# Patient Record
Sex: FEMALE | Race: WHITE | NOT HISPANIC OR LATINO | Employment: OTHER | URBAN - METROPOLITAN AREA
[De-identification: names, ages, dates, MRNs, and addresses within clinical notes are randomized per-mention and may not be internally consistent; named-entity substitution may affect disease eponyms.]

---

## 2019-12-25 ENCOUNTER — HOSPITAL ENCOUNTER (EMERGENCY)
Facility: HOSPITAL | Age: 79
Discharge: HOME | End: 2019-12-25
Attending: FAMILY MEDICINE
Payer: MEDICARE

## 2019-12-25 VITALS
HEART RATE: 55 BPM | SYSTOLIC BLOOD PRESSURE: 113 MMHG | RESPIRATION RATE: 9 BRPM | OXYGEN SATURATION: 99 % | DIASTOLIC BLOOD PRESSURE: 56 MMHG

## 2019-12-25 DIAGNOSIS — R42 DIZZINESS: ICD-10-CM

## 2019-12-25 DIAGNOSIS — I95.9 HYPOTENSION: ICD-10-CM

## 2019-12-25 DIAGNOSIS — R00.1 BRADYCARDIA: Primary | ICD-10-CM

## 2019-12-25 LAB
ALBUMIN SERPL BCP-MCNC: 3.3 G/DL (ref 3.5–5.2)
ALP SERPL-CCNC: 50 U/L (ref 38–126)
ALT SERPL W/O P-5'-P-CCNC: 13 U/L (ref 10–44)
ANION GAP SERPL CALC-SCNC: 4 MMOL/L (ref 8–16)
APTT BLDCRRT: 25.9 SEC (ref 21–32)
AST SERPL-CCNC: 19 U/L (ref 15–46)
BACTERIA #/AREA URNS AUTO: ABNORMAL /HPF
BASOPHILS # BLD AUTO: 0.05 K/UL (ref 0–0.2)
BASOPHILS NFR BLD: 1 % (ref 0–1.9)
BILIRUB SERPL-MCNC: 0.2 MG/DL (ref 0.1–1)
BILIRUB UR QL STRIP: ABNORMAL
BUN SERPL-MCNC: 18 MG/DL (ref 7–17)
CALCIUM SERPL-MCNC: 8.9 MG/DL (ref 8.7–10.5)
CHLORIDE SERPL-SCNC: 110 MMOL/L (ref 95–110)
CLARITY UR REFRACT.AUTO: ABNORMAL
CO2 SERPL-SCNC: 26 MMOL/L (ref 23–29)
COLOR UR AUTO: ABNORMAL
CREAT SERPL-MCNC: 0.85 MG/DL (ref 0.5–1.4)
DIFFERENTIAL METHOD: ABNORMAL
EOSINOPHIL # BLD AUTO: 0.3 K/UL (ref 0–0.5)
EOSINOPHIL NFR BLD: 5.4 % (ref 0–8)
ERYTHROCYTE [DISTWIDTH] IN BLOOD BY AUTOMATED COUNT: 13.1 % (ref 11.5–14.5)
EST. GFR  (AFRICAN AMERICAN): >60 ML/MIN/1.73 M^2
EST. GFR  (NON AFRICAN AMERICAN): >60 ML/MIN/1.73 M^2
GLUCOSE SERPL-MCNC: 109 MG/DL (ref 70–110)
GLUCOSE UR QL STRIP: NEGATIVE
HCT VFR BLD AUTO: 34.8 % (ref 37–48.5)
HGB BLD-MCNC: 10.8 G/DL (ref 12–16)
HGB UR QL STRIP: ABNORMAL
IMM GRANULOCYTES # BLD AUTO: 0.02 K/UL (ref 0–0.04)
IMM GRANULOCYTES NFR BLD AUTO: 0.4 % (ref 0–0.5)
INR PPP: 1.1 (ref 0.8–1.2)
KETONES UR QL STRIP: ABNORMAL
LEUKOCYTE ESTERASE UR QL STRIP: ABNORMAL
LYMPHOCYTES # BLD AUTO: 1.6 K/UL (ref 1–4.8)
LYMPHOCYTES NFR BLD: 30.1 % (ref 18–48)
MCH RBC QN AUTO: 31 PG (ref 27–31)
MCHC RBC AUTO-ENTMCNC: 31 G/DL (ref 32–36)
MCV RBC AUTO: 100 FL (ref 82–98)
MICROSCOPIC COMMENT: ABNORMAL
MONOCYTES # BLD AUTO: 0.6 K/UL (ref 0.3–1)
MONOCYTES NFR BLD: 11.1 % (ref 4–15)
NEUTROPHILS # BLD AUTO: 2.7 K/UL (ref 1.8–7.7)
NEUTROPHILS NFR BLD: 52 % (ref 38–73)
NITRITE UR QL STRIP: NEGATIVE
NRBC BLD-RTO: 0 /100 WBC
NT-PROBNP: 1270 PG/ML (ref 5–1800)
PH UR STRIP: 5 [PH] (ref 5–8)
PLATELET # BLD AUTO: 209 K/UL (ref 150–350)
PMV BLD AUTO: 10.7 FL (ref 9.2–12.9)
POTASSIUM SERPL-SCNC: 4.2 MMOL/L (ref 3.5–5.1)
PROT SERPL-MCNC: 6 G/DL (ref 6–8.4)
PROT UR QL STRIP: ABNORMAL
PROTHROMBIN TIME: 12 SEC (ref 9–12.5)
RBC # BLD AUTO: 3.48 M/UL (ref 4–5.4)
RBC #/AREA URNS AUTO: 4 /HPF (ref 0–4)
SODIUM SERPL-SCNC: 140 MMOL/L (ref 136–145)
SP GR UR STRIP: 1.02 (ref 1–1.03)
TROPONIN I SERPL DL<=0.01 NG/ML-MCNC: <0.012 NG/ML (ref 0.01–0.03)
URN SPEC COLLECT METH UR: ABNORMAL
UROBILINOGEN UR STRIP-ACNC: NEGATIVE EU/DL
WBC # BLD AUTO: 5.22 K/UL (ref 3.9–12.7)
WBC #/AREA URNS AUTO: 8 /HPF (ref 0–5)

## 2019-12-25 PROCEDURE — 96361 HYDRATE IV INFUSION ADD-ON: CPT | Mod: ER

## 2019-12-25 PROCEDURE — 93005 ELECTROCARDIOGRAM TRACING: CPT | Mod: ER

## 2019-12-25 PROCEDURE — 84484 ASSAY OF TROPONIN QUANT: CPT | Mod: ER

## 2019-12-25 PROCEDURE — 80053 COMPREHEN METABOLIC PANEL: CPT | Mod: ER

## 2019-12-25 PROCEDURE — 93010 EKG 12-LEAD: ICD-10-PCS | Mod: ,,, | Performed by: INTERNAL MEDICINE

## 2019-12-25 PROCEDURE — 85730 THROMBOPLASTIN TIME PARTIAL: CPT | Mod: ER

## 2019-12-25 PROCEDURE — 63600175 PHARM REV CODE 636 W HCPCS: Mod: ER | Performed by: FAMILY MEDICINE

## 2019-12-25 PROCEDURE — 81000 URINALYSIS NONAUTO W/SCOPE: CPT | Mod: ER

## 2019-12-25 PROCEDURE — 83880 ASSAY OF NATRIURETIC PEPTIDE: CPT | Mod: ER

## 2019-12-25 PROCEDURE — 85610 PROTHROMBIN TIME: CPT | Mod: ER

## 2019-12-25 PROCEDURE — 85025 COMPLETE CBC W/AUTO DIFF WBC: CPT | Mod: ER

## 2019-12-25 PROCEDURE — 96360 HYDRATION IV INFUSION INIT: CPT | Mod: ER

## 2019-12-25 PROCEDURE — 99285 EMERGENCY DEPT VISIT HI MDM: CPT | Mod: 25,ER

## 2019-12-25 PROCEDURE — 93010 ELECTROCARDIOGRAM REPORT: CPT | Mod: ,,, | Performed by: INTERNAL MEDICINE

## 2019-12-25 PROCEDURE — 81003 URINALYSIS AUTO W/O SCOPE: CPT | Mod: ER

## 2019-12-25 RX ORDER — ATORVASTATIN CALCIUM 20 MG/1
20 TABLET, FILM COATED ORAL DAILY
COMMUNITY

## 2019-12-25 RX ORDER — LEVOTHYROXINE SODIUM 25 UG/1
25 TABLET ORAL DAILY
COMMUNITY

## 2019-12-25 RX ORDER — METOPROLOL SUCCINATE 25 MG/1
25 TABLET, EXTENDED RELEASE ORAL DAILY
COMMUNITY

## 2019-12-25 RX ADMIN — SODIUM CHLORIDE 1000 ML: 0.9 INJECTION, SOLUTION INTRAVENOUS at 03:12

## 2019-12-25 NOTE — ED NOTES
Received call from Emiliana in lab, states no blue top tube in lab for add-on coags, notified primary nurse.

## 2019-12-25 NOTE — ED PROVIDER NOTES
Encounter Date: 2019       History     Chief Complaint   Patient presents with    Dizziness     Pt states woke up from nap 1.5 hours PTA with dizziness and dry mouth.  Pt denies SOB, denies CP.       Patient complains of generalized weakness for last few days.  Mild dizziness since last 2 hr.  No headache, nausea or vomiting.  No blurring of vision.  Dry mouth and tingling.  No facial weakness. No focal weakness. No change in speech or voice.  No chest pain or shortness of breath. Patient is awake alert oriented to time place and person.  No headache, nausea or vomiting. No facial deviation.  Daughter says she is tired and weak.  Exhausted for last few days.  History of stroke in 88.  No residual weakness or symptoms.    The history is provided by the patient.     Review of patient's allergies indicates:  No Known Allergies  Past Medical History:   Diagnosis Date    Arthritis     Asthma     Hypertension     Lymphedema b    per pt    Stroke     Thyroid disease      Past Surgical History:   Procedure Laterality Date    APPENDECTOMY      BACK SURGERY       SECTION      HYSTERECTOMY      JOINT REPLACEMENT Left     knee    LYMPHADENECTOMY Left     groin per pt     History reviewed. No pertinent family history.  Social History     Tobacco Use    Smoking status: Never Smoker    Smokeless tobacco: Never Used   Substance Use Topics    Alcohol use: Not Currently    Drug use: Never     Review of Systems   Constitutional: Negative for activity change, appetite change, chills and fever.   HENT: Negative for congestion, ear discharge, rhinorrhea, sinus pressure, sinus pain, sore throat and trouble swallowing.    Eyes: Negative for photophobia, pain, discharge, redness, itching and visual disturbance.   Respiratory: Negative for cough, chest tightness, shortness of breath and wheezing.    Cardiovascular: Negative for chest pain, palpitations and leg swelling.   Gastrointestinal: Negative for  abdominal distention, abdominal pain, constipation, diarrhea, nausea and vomiting.   Genitourinary: Negative for dysuria, flank pain, frequency and hematuria.   Musculoskeletal: Negative for back pain, gait problem, neck pain and neck stiffness.   Skin: Negative for rash and wound.   Neurological: Positive for dizziness. Negative for tremors, seizures, syncope, speech difficulty, weakness, light-headedness, numbness and headaches.   Psychiatric/Behavioral: Negative for behavioral problems, confusion, hallucinations and sleep disturbance. The patient is not nervous/anxious.    All other systems reviewed and are negative.      Physical Exam     Initial Vitals   BP Pulse Resp Temp SpO2   -- -- -- -- --      MAP       --         Physical Exam    Nursing note and vitals reviewed.  Constitutional: Vital signs are normal. She appears well-developed and well-nourished. She is not diaphoretic. She is active. No distress.   HENT:   Head: Normocephalic and atraumatic.   Right Ear: Tympanic membrane and external ear normal.   Left Ear: Tympanic membrane and external ear normal.   Nose: Nose normal.   Mouth/Throat: Oropharynx is clear and moist.   Eyes: Conjunctivae, EOM and lids are normal. Pupils are equal, round, and reactive to light.   Neck: Trachea normal, normal range of motion and full passive range of motion without pain. Neck supple. Normal range of motion present. No neck rigidity.   Cardiovascular: Normal rate, regular rhythm, S1 normal, S2 normal, normal heart sounds, intact distal pulses and normal pulses.   No murmur heard.  Pulmonary/Chest: Breath sounds normal. No respiratory distress. She has no wheezes. She has no rhonchi. She has no rales. She exhibits no tenderness.   Abdominal: Soft. Normal appearance and bowel sounds are normal. She exhibits no distension. There is no tenderness. There is no rebound and no guarding.   Musculoskeletal: Normal range of motion. She exhibits edema. She exhibits no tenderness.    Lymphadenopathy:     She has no cervical adenopathy.   Neurological: She is alert and oriented to person, place, and time. She has normal strength and normal reflexes. No cranial nerve deficit or sensory deficit. GCS score is 15. GCS eye subscore is 4. GCS verbal subscore is 5. GCS motor subscore is 6.   Skin: Skin is warm and intact. Capillary refill takes less than 2 seconds. No abrasion, no bruising and no rash noted.   Psychiatric: She has a normal mood and affect. Her speech is normal and behavior is normal. Judgment and thought content normal. She is not actively hallucinating. Cognition and memory are normal. She is attentive.         ED Course   Procedures  Labs Reviewed   URINALYSIS, REFLEX TO URINE CULTURE   CBC W/ AUTO DIFFERENTIAL   COMPREHENSIVE METABOLIC PANEL     EKG Readings: (Independently Interpreted)   Initial Reading: No STEMI. Rhythm: Sinus Bradycardia. Heart Rate: 57. Ectopy: No Ectopy. Conduction: Normal. ST Segments: Normal ST Segments. T Waves: Normal. Clinical Impression: Sinus Bradycardia       Imaging Results    None          Medical Decision Making:   Initial Assessment:   Dizziness with dry mouth  No Chest pain or SOB  Differential Diagnosis:   Bradycardia.   Hypotension.  Dehydration.  Clinical Tests:   Lab Tests: Ordered and Reviewed  Radiological Study: Ordered and Reviewed  Medical Tests: Ordered and Reviewed  ED Management:  Hydrated with IVF with improvement of blood pressure.  Pt is on Betablocker- Advised to hold and f/u with PCP/Cardiology due to low blood pressure and low heart rate.  F/U ED with worsening symptoms.                                 Clinical Impression:       ICD-10-CM ICD-9-CM   1. Bradycardia R00.1 427.89   2. Dizziness R42 780.4   3. Hypotension I95.9 458.9         Disposition:   Disposition: Discharged  Condition: Stable                     Ranjit Pierce MD  01/01/20 [id number]

## 2024-01-01 ENCOUNTER — HOSPITAL ENCOUNTER (INPATIENT)
Facility: HOSPITAL | Age: 84
LOS: 2 days | DRG: 283 | End: 2024-10-06
Attending: EMERGENCY MEDICINE | Admitting: STUDENT IN AN ORGANIZED HEALTH CARE EDUCATION/TRAINING PROGRAM
Payer: MEDICARE

## 2024-01-01 VITALS
WEIGHT: 154.31 LBS | BODY MASS INDEX: 25.71 KG/M2 | OXYGEN SATURATION: 91 % | DIASTOLIC BLOOD PRESSURE: 29 MMHG | HEART RATE: 42 BPM | HEIGHT: 65 IN | SYSTOLIC BLOOD PRESSURE: 60 MMHG | RESPIRATION RATE: 22 BRPM | TEMPERATURE: 100 F

## 2024-01-01 DIAGNOSIS — I46.9 CARDIAC ARREST: ICD-10-CM

## 2024-01-01 DIAGNOSIS — Z51.5 COMFORT MEASURES ONLY STATUS: ICD-10-CM

## 2024-01-01 DIAGNOSIS — E87.6 HYPOKALEMIA: ICD-10-CM

## 2024-01-01 DIAGNOSIS — E87.20 LACTIC ACIDOSIS: ICD-10-CM

## 2024-01-01 DIAGNOSIS — I21.4 NSTEMI (NON-ST ELEVATED MYOCARDIAL INFARCTION): ICD-10-CM

## 2024-01-01 DIAGNOSIS — I95.9 HYPOTENSION, UNSPECIFIED HYPOTENSION TYPE: Primary | ICD-10-CM

## 2024-01-01 DIAGNOSIS — J96.90 RESPIRATORY FAILURE, UNSPECIFIED CHRONICITY, UNSPECIFIED WHETHER WITH HYPOXIA OR HYPERCAPNIA: ICD-10-CM

## 2024-01-01 LAB
ALBUMIN SERPL BCP-MCNC: 2.3 G/DL (ref 3.5–5.2)
ALLENS TEST: YES
ALP SERPL-CCNC: 66 U/L (ref 55–135)
ALT SERPL W/O P-5'-P-CCNC: 61 U/L (ref 10–44)
ANION GAP SERPL CALC-SCNC: 19 MMOL/L (ref 8–16)
ANISOCYTOSIS BLD QL SMEAR: SLIGHT
AST SERPL-CCNC: 94 U/L (ref 10–40)
BASOPHILS # BLD AUTO: ABNORMAL K/UL (ref 0–0.2)
BASOPHILS NFR BLD: 0 % (ref 0–1.9)
BILIRUB SERPL-MCNC: 0.5 MG/DL (ref 0.1–1)
BNP SERPL-MCNC: 406 PG/ML (ref 0–99)
BUN SERPL-MCNC: 10 MG/DL (ref 8–23)
BURR CELLS BLD QL SMEAR: ABNORMAL
CALCIUM SERPL-MCNC: 7.6 MG/DL (ref 8.7–10.5)
CHLORIDE SERPL-SCNC: 110 MMOL/L (ref 95–110)
CK SERPL-CCNC: 90 U/L (ref 20–180)
CO2 SERPL-SCNC: 11 MMOL/L (ref 23–29)
CREAT SERPL-MCNC: 0.9 MG/DL (ref 0.5–1.4)
DIFFERENTIAL METHOD BLD: ABNORMAL
EOSINOPHIL # BLD AUTO: ABNORMAL K/UL (ref 0–0.5)
EOSINOPHIL NFR BLD: 1 % (ref 0–8)
ERYTHROCYTE [DISTWIDTH] IN BLOOD BY AUTOMATED COUNT: 15.2 % (ref 11.5–14.5)
EST. GFR  (NO RACE VARIABLE): >60 ML/MIN/1.73 M^2
FIO2: 21 %
FIO2: 70 %
GLUCOSE SERPL-MCNC: 294 MG/DL (ref 70–110)
HCT VFR BLD AUTO: 33 % (ref 37–48.5)
HCT VFR BLD CALC: 30.8 % (ref 36–54)
HGB BLD-MCNC: 10.1 G/DL (ref 9–18)
HGB BLD-MCNC: 10.3 G/DL (ref 12–16)
HYPOCHROMIA BLD QL SMEAR: ABNORMAL
IMM GRANULOCYTES # BLD AUTO: ABNORMAL K/UL (ref 0–0.04)
IMM GRANULOCYTES NFR BLD AUTO: ABNORMAL % (ref 0–0.5)
INR PPP: 1.2 (ref 0.8–1.2)
LACTATE SERPL-SCNC: 9.8 MMOL/L (ref 0.5–2.2)
LDH SERPL L TO P-CCNC: 9.9 MMOL/L (ref 0.5–2.2)
LYMPHOCYTES # BLD AUTO: ABNORMAL K/UL (ref 1–4.8)
LYMPHOCYTES NFR BLD: 56 % (ref 18–48)
MAGNESIUM SERPL-MCNC: 1.7 MG/DL (ref 1.6–2.6)
MCH RBC QN AUTO: 30.6 PG (ref 27–31)
MCHC RBC AUTO-ENTMCNC: 31.2 G/DL (ref 32–36)
MCV RBC AUTO: 98 FL (ref 82–98)
METAMYELOCYTES NFR BLD MANUAL: 1 %
MONOCYTES # BLD AUTO: ABNORMAL K/UL (ref 0.3–1)
MONOCYTES NFR BLD: 5 % (ref 4–15)
NEUTROPHILS NFR BLD: 33 % (ref 38–73)
NEUTS BAND NFR BLD MANUAL: 4 %
NRBC BLD-RTO: 0 /100 WBC
OHS QRS DURATION: 152 MS
OHS QRS DURATION: 154 MS
OHS QRS DURATION: 158 MS
OHS QTC CALCULATION: 554 MS
OHS QTC CALCULATION: 576 MS
OHS QTC CALCULATION: 582 MS
OVALOCYTES BLD QL SMEAR: ABNORMAL
PCO2 BLDA: 34.5 MMHG (ref 35–45)
PCO2 BLDA: 40.9 MMHG (ref 35–45)
PEEP: 5
PH SMN: 7.15 [PH] (ref 7.35–7.45)
PH SMN: 7.16 [PH] (ref 7.35–7.45)
PLATELET # BLD AUTO: 214 K/UL (ref 150–450)
PLATELET BLD QL SMEAR: ABNORMAL
PMV BLD AUTO: 10.3 FL (ref 9.2–12.9)
PO2 BLDA: 242 MMHG (ref 80–100)
PO2 BLDA: 66 MMHG (ref 40–60)
POC BASE DEFICIT: -13.6 MMOL/L (ref -2–2)
POC BASE DEFICIT: -15.8 MMOL/L (ref -2–2)
POC HCO3: 12 MMOL/L (ref 24–28)
POC HCO3: 14.5 MMOL/L (ref 24–28)
POC IONIZED CALCIUM: 1.01 MMOL/L (ref 1.06–1.42)
POC PERFORMED BY: ABNORMAL
POC PERFORMED BY: ABNORMAL
POC SATURATED O2: 85.9 % (ref 95–100)
POC SATURATED O2: 99.6 % (ref 95–100)
POC SET RR: 22
POCT GLUCOSE: 219 MG/DL (ref 70–110)
POIKILOCYTOSIS BLD QL SMEAR: SLIGHT
POLYCHROMASIA BLD QL SMEAR: ABNORMAL
POTASSIUM BLD-SCNC: 2.6 MMOL/L (ref 3.5–5.1)
POTASSIUM SERPL-SCNC: 2.6 MMOL/L (ref 3.5–5.1)
PROT SERPL-MCNC: 4.9 G/DL (ref 6–8.4)
PROTHROMBIN TIME: 12.7 SEC (ref 9–12.5)
RBC # BLD AUTO: 3.37 M/UL (ref 4–5.4)
SODIUM BLD-SCNC: 142 MMOL/L (ref 136–145)
SODIUM SERPL-SCNC: 140 MMOL/L (ref 136–145)
SPECIMEN SOURCE: ABNORMAL
SPECIMEN SOURCE: ABNORMAL
TROPONIN I SERPL DL<=0.01 NG/ML-MCNC: 0.16 NG/ML (ref 0–0.03)
VT: 350
WBC # BLD AUTO: 6.77 K/UL (ref 3.9–12.7)

## 2024-01-01 PROCEDURE — 85027 COMPLETE CBC AUTOMATED: CPT | Performed by: EMERGENCY MEDICINE

## 2024-01-01 PROCEDURE — 63600175 PHARM REV CODE 636 W HCPCS

## 2024-01-01 PROCEDURE — 20000000 HC ICU ROOM

## 2024-01-01 PROCEDURE — 25000003 PHARM REV CODE 250

## 2024-01-01 PROCEDURE — 99900035 HC TECH TIME PER 15 MIN (STAT)

## 2024-01-01 PROCEDURE — 94761 N-INVAS EAR/PLS OXIMETRY MLT: CPT

## 2024-01-01 PROCEDURE — 94003 VENT MGMT INPAT SUBQ DAY: CPT

## 2024-01-01 PROCEDURE — 82803 BLOOD GASES ANY COMBINATION: CPT

## 2024-01-01 PROCEDURE — 93005 ELECTROCARDIOGRAM TRACING: CPT

## 2024-01-01 PROCEDURE — 85610 PROTHROMBIN TIME: CPT | Performed by: EMERGENCY MEDICINE

## 2024-01-01 PROCEDURE — 93010 ELECTROCARDIOGRAM REPORT: CPT | Mod: ,,, | Performed by: INTERNAL MEDICINE

## 2024-01-01 PROCEDURE — 36600 WITHDRAWAL OF ARTERIAL BLOOD: CPT

## 2024-01-01 PROCEDURE — 80053 COMPREHEN METABOLIC PANEL: CPT | Performed by: EMERGENCY MEDICINE

## 2024-01-01 PROCEDURE — 11000001 HC ACUTE MED/SURG PRIVATE ROOM

## 2024-01-01 PROCEDURE — 83605 ASSAY OF LACTIC ACID: CPT | Performed by: EMERGENCY MEDICINE

## 2024-01-01 PROCEDURE — 84484 ASSAY OF TROPONIN QUANT: CPT | Performed by: EMERGENCY MEDICINE

## 2024-01-01 PROCEDURE — 85007 BL SMEAR W/DIFF WBC COUNT: CPT | Performed by: EMERGENCY MEDICINE

## 2024-01-01 PROCEDURE — 25000003 PHARM REV CODE 250: Performed by: EMERGENCY MEDICINE

## 2024-01-01 PROCEDURE — 96375 TX/PRO/DX INJ NEW DRUG ADDON: CPT

## 2024-01-01 PROCEDURE — 99291 CRITICAL CARE FIRST HOUR: CPT | Mod: ,,, | Performed by: INTERNAL MEDICINE

## 2024-01-01 PROCEDURE — 83735 ASSAY OF MAGNESIUM: CPT | Performed by: EMERGENCY MEDICINE

## 2024-01-01 PROCEDURE — 27100171 HC OXYGEN HIGH FLOW UP TO 24 HOURS

## 2024-01-01 PROCEDURE — 0BH17EZ INSERTION OF ENDOTRACHEAL AIRWAY INTO TRACHEA, VIA NATURAL OR ARTIFICIAL OPENING: ICD-10-PCS

## 2024-01-01 PROCEDURE — 25500020 PHARM REV CODE 255: Performed by: EMERGENCY MEDICINE

## 2024-01-01 PROCEDURE — 96368 THER/DIAG CONCURRENT INF: CPT

## 2024-01-01 PROCEDURE — 96366 THER/PROPH/DIAG IV INF ADDON: CPT

## 2024-01-01 PROCEDURE — 96365 THER/PROPH/DIAG IV INF INIT: CPT

## 2024-01-01 PROCEDURE — 82550 ASSAY OF CK (CPK): CPT | Performed by: EMERGENCY MEDICINE

## 2024-01-01 PROCEDURE — 83880 ASSAY OF NATRIURETIC PEPTIDE: CPT | Performed by: EMERGENCY MEDICINE

## 2024-01-01 PROCEDURE — 99291 CRITICAL CARE FIRST HOUR: CPT

## 2024-01-01 PROCEDURE — 5A1935Z RESPIRATORY VENTILATION, LESS THAN 24 CONSECUTIVE HOURS: ICD-10-PCS

## 2024-01-01 PROCEDURE — 63600175 PHARM REV CODE 636 W HCPCS: Performed by: INTERNAL MEDICINE

## 2024-01-01 RX ORDER — MORPHINE SULFATE 2 MG/ML
2 INJECTION, SOLUTION INTRAMUSCULAR; INTRAVENOUS EVERY 4 HOURS PRN
Status: DISCONTINUED | OUTPATIENT
Start: 2024-01-01 | End: 2024-01-01

## 2024-01-01 RX ORDER — LORAZEPAM 2 MG/ML
1 INJECTION INTRAMUSCULAR
Status: DISCONTINUED | OUTPATIENT
Start: 2024-01-01 | End: 2024-01-01 | Stop reason: HOSPADM

## 2024-01-01 RX ORDER — TALC
6 POWDER (GRAM) TOPICAL NIGHTLY PRN
Status: CANCELLED | OUTPATIENT
Start: 2024-01-01

## 2024-01-01 RX ORDER — MUPIROCIN 20 MG/G
OINTMENT TOPICAL 2 TIMES DAILY
Status: DISCONTINUED | OUTPATIENT
Start: 2024-01-01 | End: 2024-01-01 | Stop reason: HOSPADM

## 2024-01-01 RX ORDER — MORPHINE SULFATE 2 MG/ML
2 INJECTION, SOLUTION INTRAMUSCULAR; INTRAVENOUS
Status: DISCONTINUED | OUTPATIENT
Start: 2024-01-01 | End: 2024-01-01

## 2024-01-01 RX ORDER — SODIUM CHLORIDE 9 MG/ML
1000 INJECTION, SOLUTION INTRAVENOUS
Status: COMPLETED | OUTPATIENT
Start: 2024-01-01 | End: 2024-01-01

## 2024-01-01 RX ORDER — PROPOFOL 10 MG/ML
0-50 INJECTION, EMULSION INTRAVENOUS CONTINUOUS
Status: DISCONTINUED | OUTPATIENT
Start: 2024-01-01 | End: 2024-01-01

## 2024-01-01 RX ORDER — NOREPINEPHRINE BITARTRATE/D5W 4MG/250ML
0-3 PLASTIC BAG, INJECTION (ML) INTRAVENOUS CONTINUOUS
Status: DISCONTINUED | OUTPATIENT
Start: 2024-01-01 | End: 2024-01-01

## 2024-01-01 RX ORDER — LORAZEPAM 2 MG/ML
1 INJECTION INTRAMUSCULAR EVERY 4 HOURS PRN
Status: DISCONTINUED | OUTPATIENT
Start: 2024-01-01 | End: 2024-01-01

## 2024-01-01 RX ORDER — SUCCINYLCHOLINE CHLORIDE 20 MG/ML
INJECTION INTRAMUSCULAR; INTRAVENOUS
Status: COMPLETED
Start: 2024-01-01 | End: 2024-01-01

## 2024-01-01 RX ORDER — ETOMIDATE 2 MG/ML
INJECTION INTRAVENOUS
Status: COMPLETED
Start: 2024-01-01 | End: 2024-01-01

## 2024-01-01 RX ORDER — FENTANYL CITRATE-0.9 % NACL/PF 10 MCG/ML
0-250 PLASTIC BAG, INJECTION (ML) INTRAVENOUS CONTINUOUS
Status: DISCONTINUED | OUTPATIENT
Start: 2024-01-01 | End: 2024-01-01

## 2024-01-01 RX ORDER — MORPHINE SULFATE 2 MG/ML
5 INJECTION, SOLUTION INTRAMUSCULAR; INTRAVENOUS EVERY 30 MIN PRN
Status: DISCONTINUED | OUTPATIENT
Start: 2024-01-01 | End: 2024-01-01 | Stop reason: HOSPADM

## 2024-01-01 RX ORDER — MORPHINE SULFATE 2 MG/ML
5 INJECTION, SOLUTION INTRAMUSCULAR; INTRAVENOUS
Status: DISCONTINUED | OUTPATIENT
Start: 2024-01-01 | End: 2024-01-01

## 2024-01-01 RX ORDER — SODIUM CHLORIDE 0.9 % (FLUSH) 0.9 %
10 SYRINGE (ML) INJECTION
Status: CANCELLED | OUTPATIENT
Start: 2024-01-01

## 2024-01-01 RX ORDER — POTASSIUM CHLORIDE 7.45 MG/ML
10 INJECTION INTRAVENOUS
Status: DISCONTINUED | OUTPATIENT
Start: 2024-01-01 | End: 2024-01-01

## 2024-01-01 RX ORDER — TRAZODONE HYDROCHLORIDE 50 MG/1
50 TABLET ORAL NIGHTLY PRN
Status: ON HOLD | COMMUNITY
Start: 2024-01-01 | End: 2024-01-01

## 2024-01-01 RX ADMIN — LORAZEPAM 1 MG: 2 INJECTION INTRAMUSCULAR; INTRAVENOUS at 07:10

## 2024-01-01 RX ADMIN — LORAZEPAM 1 MG: 2 INJECTION INTRAMUSCULAR; INTRAVENOUS at 09:10

## 2024-01-01 RX ADMIN — POTASSIUM CHLORIDE 10 MEQ: 7.46 INJECTION, SOLUTION INTRAVENOUS at 02:10

## 2024-01-01 RX ADMIN — MORPHINE SULFATE 2 MG: 2 INJECTION, SOLUTION INTRAMUSCULAR; INTRAVENOUS at 02:10

## 2024-01-01 RX ADMIN — MORPHINE SULFATE 5 MG: 2 INJECTION, SOLUTION INTRAMUSCULAR; INTRAVENOUS at 05:10

## 2024-01-01 RX ADMIN — MORPHINE SULFATE 5 MG: 2 INJECTION, SOLUTION INTRAMUSCULAR; INTRAVENOUS at 07:10

## 2024-01-01 RX ADMIN — PROPOFOL 10 MCG/KG/MIN: 10 INJECTION, EMULSION INTRAVENOUS at 12:10

## 2024-01-01 RX ADMIN — MORPHINE SULFATE 10 MG/HR: 10 INJECTION, SOLUTION INTRAMUSCULAR; INTRAVENOUS at 04:10

## 2024-01-01 RX ADMIN — MORPHINE SULFATE 10 MG/HR: 10 INJECTION, SOLUTION INTRAMUSCULAR; INTRAVENOUS at 03:10

## 2024-01-01 RX ADMIN — MORPHINE SULFATE 5 MG: 2 INJECTION, SOLUTION INTRAMUSCULAR; INTRAVENOUS at 03:10

## 2024-01-01 RX ADMIN — LORAZEPAM 1 MG: 2 INJECTION INTRAMUSCULAR; INTRAVENOUS at 04:10

## 2024-01-01 RX ADMIN — MORPHINE SULFATE 5 MG: 2 INJECTION, SOLUTION INTRAMUSCULAR; INTRAVENOUS at 10:10

## 2024-01-01 RX ADMIN — SUCCINYLCHOLINE CHLORIDE 80 MG: 20 INJECTION, SOLUTION INTRAMUSCULAR; INTRAVENOUS; PARENTERAL at 12:10

## 2024-01-01 RX ADMIN — MORPHINE SULFATE 2 MG: 2 INJECTION, SOLUTION INTRAMUSCULAR; INTRAVENOUS at 04:10

## 2024-01-01 RX ADMIN — MORPHINE SULFATE 2 MG: 2 INJECTION, SOLUTION INTRAMUSCULAR; INTRAVENOUS at 03:10

## 2024-01-01 RX ADMIN — MORPHINE SULFATE 0.5 MG/HR: 10 INJECTION, SOLUTION INTRAMUSCULAR; INTRAVENOUS at 04:10

## 2024-01-01 RX ADMIN — MORPHINE SULFATE 10 MG/HR: 10 INJECTION, SOLUTION INTRAMUSCULAR; INTRAVENOUS at 02:10

## 2024-01-01 RX ADMIN — GLYCOPYRROLATE 0.1 MG: 0.2 INJECTION, SOLUTION INTRAMUSCULAR; INTRAVITREAL at 02:10

## 2024-01-01 RX ADMIN — Medication 50 MCG/HR: at 12:10

## 2024-01-01 RX ADMIN — LORAZEPAM 1 MG: 2 INJECTION INTRAMUSCULAR; INTRAVENOUS at 02:10

## 2024-01-01 RX ADMIN — MORPHINE SULFATE 5 MG: 2 INJECTION, SOLUTION INTRAMUSCULAR; INTRAVENOUS at 04:10

## 2024-01-01 RX ADMIN — LORAZEPAM 1 MG: 2 INJECTION INTRAMUSCULAR; INTRAVENOUS at 03:10

## 2024-01-01 RX ADMIN — MORPHINE SULFATE 10 MG/HR: 10 INJECTION, SOLUTION INTRAMUSCULAR; INTRAVENOUS at 12:10

## 2024-01-01 RX ADMIN — LORAZEPAM 1 MG: 2 INJECTION INTRAMUSCULAR; INTRAVENOUS at 10:10

## 2024-01-01 RX ADMIN — MORPHINE SULFATE 5 MG: 2 INJECTION, SOLUTION INTRAMUSCULAR; INTRAVENOUS at 06:10

## 2024-01-01 RX ADMIN — LORAZEPAM 1 MG: 2 INJECTION INTRAMUSCULAR; INTRAVENOUS at 12:10

## 2024-01-01 RX ADMIN — ETOMIDATE 20 MG: 2 INJECTION INTRAVENOUS at 11:10

## 2024-01-01 RX ADMIN — Medication 0.06 MCG/KG/MIN: at 11:10

## 2024-01-01 RX ADMIN — IOHEXOL 100 ML: 350 INJECTION, SOLUTION INTRAVENOUS at 01:10

## 2024-01-01 RX ADMIN — SODIUM CHLORIDE 1000 ML: 9 INJECTION, SOLUTION INTRAVENOUS at 11:10

## 2024-01-01 RX ADMIN — LORAZEPAM 1 MG: 2 INJECTION INTRAMUSCULAR; INTRAVENOUS at 06:10

## 2024-01-01 RX ADMIN — MORPHINE SULFATE 5 MG: 2 INJECTION, SOLUTION INTRAMUSCULAR; INTRAVENOUS at 09:10

## 2024-10-04 PROBLEM — I95.9 HYPOTENSION: Status: RESOLVED | Noted: 2024-01-01 | Resolved: 2024-01-01

## 2024-10-04 PROBLEM — Z51.5 COMFORT MEASURES ONLY STATUS: Status: ACTIVE | Noted: 2024-01-01

## 2024-10-04 PROBLEM — I46.9 CARDIAC ARREST: Status: ACTIVE | Noted: 2024-01-01

## 2024-10-04 PROBLEM — I21.4 NSTEMI (NON-ST ELEVATED MYOCARDIAL INFARCTION): Status: ACTIVE | Noted: 2024-01-01

## 2024-10-04 PROBLEM — I95.9 HYPOTENSION: Status: ACTIVE | Noted: 2024-01-01

## 2024-10-04 NOTE — ASSESSMENT & PLAN NOTE
Per family wishes    Plan:  Comfort care orders as below  IV morphine and IV Ativan PRN for tachypnea or discomfort

## 2024-10-04 NOTE — HPI
Nelida is an 83 yo female with PMH HTN, HLD, T2DM, and recent memory issues concerning for dementia who was brought in by EMS after cardiac arrest at home. Per EMS report, patient was complaining of chest pain for several hours. Per family, she was in town visiting her daughter Amanda but patient lives in [place]. She was able to call her niece who stated she was having difficulty speaking and moving. She reports she knew she was having a medical emergency. Niece called 911. Daughter was not home at the time and patient was home alone. She states patient did have difficulty trouble sleeping last night and was c/o left shoulder pain but was otherwise behaving at her baseline. When EMS arrived, she was found to be in VFib arrest requiring 40 minutes of resuscitation including 7 shocks and epinephrine x4. A temporary airway was placed and patient transported to ER.    In ER, she was intubated and sedated on propofol and fentanyl. Initial vitals with temp 98.4, HR 79, BP 69/48 and placed on levophed. Labs include CBC with H/H 10.3/33 and WBC within normal limits 6.77. CMP with K 2.6, CO2 11, and glucose 294 and BUN/Cr 10/0.9. CXR w/ increased prominence of the central pulmonary vasculature with bilateral diffuse nonspecific interstitial coarsening also increased from prior suggesting pulmonary edema/CHF pattern. Slight blunting of both costophrenic angle suggesting trace pleural effusions. CT with No acute large vascular territory infarct or intracranial hemorrhage identified. Cerebral volume loss with sequela of chronic microvascular ischemic change and multifocal remote appearing lacunar type infarcts, as above.. EKG w/ Sinus tachycardia with Premature supraventricular complexes and Bifascicular block . Trop 0.0157. CTA chest abdomen and pelvis with No evidence of acute aortic pathology. [facility] consulted for evaluation for admission s/p cardiac arrest. After discussion with family, patient was made DNR as  that is what her wishes were. She was transitioned to comfort care and palliatively extubated per their wishes.

## 2024-10-04 NOTE — ASSESSMENT & PLAN NOTE
S/p cardiac arrest at home requiring 7 shocks and several rounds of epinephrine  Unknown etiology at this time for arrest  Imaging in ER unrevealing    Plan:  Discussed with family and will transition to comfort care measures only to align with patient's wishes

## 2024-10-04 NOTE — ED PROVIDER NOTES
Encounter Date: 10/4/2024       History     Chief Complaint   Patient presents with    Cardiac Arrest     Pt presents to the ED for a witnessed arrest.  The pt was initially complaining of chest pain when the pt went unresponsive.  Per EMS the pt was in v-fib.  The pt was shocked x7 timed, received 4 epis, 300 mg of amiodarone, and 1 of atropine.  Pt has a pulse and is spontaneously breathing over the I-gel.      84-year-old female with a reported history of dementia and hypertension and type 2 diabetes presents with a chief complaint of cardiac arrest.  EMS says that they were called to the house because the patient was having chest pain.  They said that she had a witnessed VFib arrest, received 7 shocks and 4 epinephrine in the field with fluoroscopy achieved.      The history is provided by the EMS personnel. The history is limited by the condition of the patient. No  was used.     Review of patient's allergies indicates:  No Known Allergies  Past Medical History:   Diagnosis Date    Arthritis     Asthma     Hypertension     Lymphedema b    per pt    Stroke     Thyroid disease      Past Surgical History:   Procedure Laterality Date    APPENDECTOMY      BACK SURGERY       SECTION      HYSTERECTOMY      JOINT REPLACEMENT Left     knee    LYMPHADENECTOMY Left     groin per pt     No family history on file.  Social History     Tobacco Use    Smoking status: Never    Smokeless tobacco: Never   Substance Use Topics    Alcohol use: Not Currently    Drug use: Never     Review of Systems    Physical Exam     Initial Vitals   BP Pulse Resp Temp SpO2   10/04/24 1139 10/04/24 1139 10/04/24 1139 10/04/24 1344 10/04/24 1139   (!) 69/48 79 16 (!) 89.4 °F (31.9 °C) 99 %      MAP       --                Physical Exam    Nursing note and vitals reviewed.  Eyes: Pupils are equal, round, and reactive to light.   Pupils 4+ equal and reactive   Neck: Neck supple.   Cardiovascular:  Normal rate, normal heart  sounds and intact distal pulses.           Frequent ectopy on the monitor   Pulmonary/Chest:   I gel in place, equal breath sounds bilaterally   Abdominal: Abdomen is soft. There is no abdominal tenderness. There is no rebound and no guarding.   Musculoskeletal:         General: No edema.      Cervical back: Neck supple.     Neurological:   Patient was withdrawing to pain in the bilateral upper extremities, no response to pain in the lower extremities   Skin: Skin is warm and dry. Capillary refill takes less than 2 seconds. No rash noted. No erythema. No pallor.         ED Course   Intubation    Date/Time: 10/4/2024 1:49 PM  Location procedure was performed: [facility] EMERGENCY DEPARTMENT    Performed by: Patrick Rosales MD  Authorized by: Nick Rangel MD  Consent Done: Emergent Situation  Indications: airway protection and respiratory failure  Intubation method: direct  Patient status: paralyzed (RSI)  Preoxygenation: BVM  Sedatives: etomidate  Paralytic: succinylcholine  Laryngoscope size: Mac 3  Tube size: 7.5 mm  Tube type: cuffed  Number of attempts: 1  Cords visualized: yes  Post-procedure assessment: chest rise, ETCO2 monitor and CO2 detector  Breath sounds: equal and absent over the epigastrium  Cuff inflated: yes  ETT to lip: 19 cm  ETT to teeth (cm): edentulous.  Tube secured with: ETT sahni  Chest x-ray interpreted by me.  Chest x-ray findings: endotracheal tube too high  Tube repositioned: tube repositioned successfully  Patient tolerance: Patient tolerated the procedure well with no immediate complications  Complications: No  Specimens: No  Implants: No        Labs Reviewed   CBC W/ AUTO DIFFERENTIAL - Abnormal       Result Value    WBC 6.77      RBC 3.37 (*)     Hemoglobin 10.3 (*)     Hematocrit 33.0 (*)     MCV 98      MCH 30.6      MCHC 31.2 (*)     RDW 15.2 (*)     Platelets 214      MPV 10.3      Immature Granulocytes CANCELED      Immature Grans (Abs) CANCELED      Lymph # CANCELED      Mono #  CANCELED      Eos # CANCELED      Baso # CANCELED      nRBC 0      Gran % 33.0 (*)     Lymph % 56.0 (*)     Mono % 5.0      Eosinophil % 1.0      Basophil % 0.0      Bands 4.0      Metamyelocytes 1.0      Platelet Estimate Appears normal      Aniso Slight      Poik Slight      Poly Occasional      Hypo Occasional      Ovalocytes Occasional      Anita Cells Occasional      Differential Method Manual     COMPREHENSIVE METABOLIC PANEL - Abnormal    Sodium 140      Potassium 2.6 (*)     Chloride 110      CO2 11 (*)     Glucose 294 (*)     BUN 10      Creatinine 0.9      Calcium 7.6 (*)     Total Protein 4.9 (*)     Albumin 2.3 (*)     Total Bilirubin 0.5      Alkaline Phosphatase 66      AST 94 (*)     ALT 61 (*)     eGFR >60      Anion Gap 19 (*)     Narrative:     Potassium critical result(s) called and verbal readback obtained from   Noe Hernandez RN by [facility] 10/04/2024 12:51   TROPONIN I - Abnormal    Troponin I 0.157 (*)    LACTIC ACID, PLASMA - Abnormal    Lactate (Lactic Acid) 9.8 (*)     Narrative:     Lactic Acid critical result(s) called and verbal readback obtained   from Noe Hernandez RN by [facility] 10/04/2024 12:52   B-TYPE NATRIURETIC PEPTIDE - Abnormal     (*)    PROTIME-INR - Abnormal    Prothrombin Time 12.7 (*)     INR 1.2     POCT GLUCOSE - Abnormal    POCT Glucose 219 (*)    MAGNESIUM    Magnesium 1.7     CK    CPK 90          ECG Results              EKG 12-lead (In process)        Collection Time Result Time QRS Duration [facility] QTC Calculation    10/04/24 12:46:08 10/04/24 16:27:45 154 576                     In process by Interface, Lab In [facility] (10/04/24 16:27:54)                   Narrative:    Test Reason :     Vent. Rate : 111 BPM     Atrial Rate : 111 BPM     P-R Int : 208 ms          QRS Dur : 154 ms      QT Int : 424 ms       P-R-T Axes : 000 -79 085 degrees     QTc Int : 576 ms    Sinus tachycardia with Premature supraventricular complexes  Right bundle branch block  Left anterior  fascicular block   Bifascicular block   Abnormal ECG  When compared with ECG of 04-OCT-2024 11:55,  Sinus rhythm has replaced Atrial fibrillation  Borderline criteria for Lateral infarct are no longer Present    Referred By: AAAREFCHIRAG   SELF           Confirmed By:                                      EKG 12-lead (In process)        Collection Time Result Time QRS Duration OHS QTC Calculation    10/04/24 11:54:24 10/04/24 16:27:37 158 582                     In process by Interface, Lab In [facility] (10/04/24 16:27:44)                   Narrative:    Test Reason :     Vent. Rate : 099 BPM     Atrial Rate : 087 BPM     P-R Int : 000 ms          QRS Dur : 158 ms      QT Int : 454 ms       P-R-T Axes : 000 -78 089 degrees     QTc Int : 582 ms    Atrial fibrillation with premature ventricular or aberrantly conducted  complexes  Right bundle branch block  Left anterior fascicular block   Bifascicular block   Possible Lateral infarct (cited on or before 04-OCT-2024)  Abnormal ECG  When compared with ECG of 04-OCT-2024 11:40,  Previous ECG has undetermined rhythm, needs review  T wave inversion less evident in Anterior leads    Referred By: AAAREFCHIRAG   SELF           Confirmed By:                                      EKG 12-lead (In process)        Collection Time Result Time QRS Duration OHS QTC Calculation    10/04/24 11:40:11 10/04/24 13:05:58 152 554                     In process by Interface, Lab In [facility] (10/04/24 13:06:03)                   Narrative:    Test Reason : I46.9,    Vent. Rate : 099 BPM     Atrial Rate : 104 BPM     P-R Int : 000 ms          QRS Dur : 152 ms      QT Int : 432 ms       P-R-T Axes : 000 -79 082 degrees     QTc Int : 554 ms    Undetermined rhythm  Right bundle branch block  Left anterior fascicular block   Bifascicular block   Possible Lateral infarct ,age undetermined  Abnormal ECG  When compared with ECG of 25-DEC-2019 14:12,  Current undetermined rhythm precludes rhythm comparison,  needs review  (RBBB and left anterior fascicular block) is now Present  Borderline criteria for Lateral infarct are now Present    Referred By: AAAREFERR   SELF           Confirmed By:                                   Imaging Results              CTA Chest Abdomen Pelvis (Final result)  Result time 10/04/24 14:17:40      Final result by Zana Pool MD (10/04/24 14:17:40)                   Impression:      1. No evidence of acute aortic pathology.  2. Nonspecific solid ground-glass airspace consolidation in the dependent lungs could relate to edema, infection, noninfectious inflammatory process, and/or hemorrhage.  3. Question mild urothelial thickening enhancement of the left greater than right renal collecting systems and proximal ureters. Additionally, there are questioned patchy areas of relative hypoenhancement involving both kidneys.  Circumferential urinary bladder wall thickening and adjoining inflammatory changes are suggested, difficult to characterize given hardware related artifact in the pelvis.  Correlation for signs of urinary tract infection, pyelonephritis, as cystitis would be recommended, respectively.  4. Solid pulmonary nodule measuring on the order of 1-2 mm in the right middle lobe.  For a solid nodule <6 mm, Fleischner Society 2017 guidelines recommend no routine follow up for a low risk patient, or follow-up with non-contrast chest CT at 12 months in a high risk patient.  5. Additional details, as provided in the body report.      Electronically signed by: Zana Pool  Date:    10/04/2024  Time:    14:17               Narrative:    EXAMINATION:  CTA CHEST ABDOMEN PELVIS    CLINICAL HISTORY:  cardiac arrest; chest pain;    TECHNIQUE:  Unenhanced CT imaging of the chest, abdomen, and pelvis followed by CTA phase and delayed 2 minute phase imaging of the same anatomy: The intravenous administration of 100 mL Omnipaque 350 contrast.  Multiplanar reformats.  MIP reconstructions of the  chest.    COMPARISON:  None    FINDINGS:  Findings:    VASCULATURE    Thoracic aorta: No intramural hematoma, dissection, or aneurysm.  Moderate to heavy atherosclerotic calcification.    Aortic arch and brachiocephalic vessels: No high-grade stenosis, aneurysm, or dissection.  Left-sided arch.  Conventional 3 vessel arch anatomy.    Abdominal aorta and iliac arteries: No high-grade stenosis or aneurysmal dilatation.  Moderate to heavy atherosclerotic calcification.    Visceral arteries: The celiac, superior mesenteric, renal, and inferior mesenteric arteries appear patent.  Note is made of focal moderate to high-grade atheromatous narrowing at the origins of the right renal artery and inferior mesenteric artery with poststenotic dilatation.  Moderate atheromatous irregularity of the left renal artery origin.    Inferior vena cava: Normal caliber.  Stent is noted within the left common and external iliac vein extending to the common femoral vein.  Areas of intraluminal hyperattenuation are noted within the stent on precontrast series, possibly on the basis of chronic thrombus.  Noting this, there appears to be some degree of contrast opacification within the stent lumen as well as proximally and distally on the delayed phase series.    CHEST    Support tubes and lines: Endotracheal tube tip terminates between thoracic inlet and ute.  Enteric tube tip terminates within the stomach.  Intra osseous needle proximal left humerus.    Heart: Question mild cardiomegaly.  Cardiac valvular calcifications..    Coronary arteries: The least moderate atherosclerotic calcifications are suggested.    Pericardium: Small volume pericardial fluid, favored physiologic.    Central pulmonary arteries: Normal caliber.    Base of neck/thyroid: Heterogeneous.  No discrete nodule measuring at least 15 mm requiring imaging surveillance is identified.    Lymph nodes: No supraclavicular, axillary, internal mammary, mediastinal, or hilar  adenopathy.    Esophagus: Normal.    Pleura: Small pleural effusions.    Body wall: Unremarkable.    Airways: Central airways grossly patent.    Lungs: Assessment compromised by respiratory motion.  Dependent atelectasis.  Smooth interlobular septal thickening.  Patchy solid and ground-glass airspace consolidation dependently.  1-2 mm solid nodule in the right middle lobe (series 4, image 263).  Calcified granuloma in the lingula.    Bones: No definite acute abnormality.  Partially imaged sequela of ACDF.  Query osseous demineralization.  Relatively modest degenerative findings of the thoracic spine.    ABDOMEN/PELVIS    Liver: Fluid attenuation lesion the right hepatic lobe near the dome would be in keeping with cysts.  Subcentimeter hypodense lesions elsewhere would be too small to definitely characterize.  Mild periportal edema.    Gallbladder/bile dusts: Unremarkable. No intra or extrahepatic biliary ductal dilatation.    Pancreas: Unremarkable.    Spleen: Unremarkable.    Adrenals: Unremarkable.    Kidneys: Cortical atrophy of both kidneys.  Fluid attenuation present simple cysts of both kidneys.  Additional subcentimeter hypodense lesions are too small to definitely characterize.  Question mild urothelial thickening enhancement of the left greater than right renal collecting systems and proximal ureters.  Additionally, there are questioned patchy areas of relative hypoenhancement involving both kidneys.  Nonspecific bilateral perinephric stranding.    Lymph nodes: No abdominal or pelvic lymphadenopathy.    Bowel and mesentery: No evidence of bowel obstruction.  Moderate to large volume colonic stool.  Colonic diverticulosis.  Appendix not confidently identified.  No definite focal pericecal inflammation.    Free fluid or free air: None.    Pelvis: Limited assessment due to artifact in this patient status post right total hip arthroplasty.    Urinary bladder: Question mild circumferential urinary bladder wall  thickening with adjoining inflammatory changes.    Body wall: Unremarkable.    Bones: No definite acute abnormality.  Query osseous demineralization.  Degenerative findings of the spine left hip.                                       CT Head Without Contrast (Final result)  Result time 10/04/24 13:26:21      Final result by Abundio Estrella MD (10/04/24 13:26:21)                   Impression:      No acute large vascular territory infarct or intracranial hemorrhage identified.  If persistent neurologic deficit, MRI brain can be obtained.    Cerebral volume loss with sequela of chronic microvascular ischemic change and multifocal remote appearing lacunar type infarcts, as above.      Electronically signed by: Abundio Estrella MD  Date:    10/04/2024  Time:    13:26               Narrative:    EXAMINATION:  CT HEAD WITHOUT CONTRAST    CLINICAL HISTORY:  Mental status change, unknown cause;    TECHNIQUE:  Low dose axial CT images obtained throughout the head without intravenous contrast. Sagittal and coronal reconstructions were performed.    COMPARISON:  None.    FINDINGS:  Intracranial compartment: Brain appears normally formed.    Age-related generalized cerebral volume loss.  The ventricles are midline without distortion by mass effect or acute hydrocephalus.  No extra-axial blood or fluid collections.    Patchy and confluent hypoattenuation of the supratentorial white matter likely sequela of chronic microvascular ischemic change.  Remote appearing lacunar type infarcts with volume loss at the bilateral caudate, left corona radiata, anterior limb left internal capsule, anterior left basal ganglia, bilateral thalami and left nereida.  No parenchymal mass, hemorrhage, edema or major vascular distribution infarct.  Bilateral basal ganglia and skull base atherosclerotic vascular calcifications noted.    Skull/extracranial contents (limited evaluation): No fracture. Mastoid air cells and paranasal sinuses are essentially  clear.                                       X-Ray Chest AP Portable (Final result)  Result time 10/04/24 13:23:04      Final result by Abundio Estrella MD (10/04/24 13:23:04)                   Impression:      As above.      Electronically signed by: Abundio Estrella MD  Date:    10/04/2024  Time:    13:23               Narrative:    EXAMINATION:  XR CHEST AP PORTABLE    CLINICAL HISTORY:  Cardiac arrest, cause unspecified    TECHNIQUE:  Single frontal view of the chest was performed.    COMPARISON:  Chest radiograph 12/25/2019    FINDINGS:  Monitoring leads and tubing overlie the chest.  Presumed pacer pads also overlie the left chest limiting evaluation.  Patient is somewhat rotated.    Interval placement of endotracheal tube with tip approximately 5 cm above the ute.  Interval placement of enteric tube with tip below the diaphragm projected at the left upper quadrant likely within the proximal stomach; however, the side port projects at the region of the GE junction.  Recommend further advancement of the enteric tube 2-cm for more optimal positioning within the stomach.    Cardiomediastinal silhouette is midline with similar calcification and tortuosity of the aorta and upper limits of normal sized heart.  There is increased prominence of the central pulmonary vasculature with bilateral diffuse nonspecific interstitial coarsening also increased from prior suggesting pulmonary edema/CHF pattern.  Slight blunting of both costophrenic angle suggesting trace pleural effusions also new from prior.  Scattered areas of platelike scarring versus atelectasis.  No large consolidation or definite pneumothorax.  No acute osseous process seen.                                       Medications   fentaNYL 2500 mcg in 0.9% sodium chloride 250 mL infusion premix (100 mcg/hr Intravenous Verify Only 10/4/24 1500)   propofol (DIPRIVAN) 10 mg/mL infusion (0 mcg/kg/min × 63.5 kg Intravenous Stopped 10/4/24 1452)   glycopyrrolate (PF)  injection 0.1 mg (0.1 mg Intravenous Given 10/4/24 1443)   LORazepam injection 1 mg (1 mg Intravenous Given 10/4/24 1541)   morphine injection 2 mg (2 mg Intravenous Given 10/4/24 1541)   morphine IV infusion (has no administration in time range)   0.9%  NaCl infusion (0 mLs Intravenous Stopped 10/4/24 1324)   etomidate (AMIDATE) 2 mg/mL injection (20 mg  Given 10/4/24 1159)   succinylcholine (ANECTINE) 20 mg/mL injection (80 mg  Given 10/4/24 1212)   iohexoL (OMNIPAQUE 350) injection 100 mL (100 mLs Intravenous Given 10/4/24 1312)   fentanyl IV bolus from bag/infusion 50 mcg (50 mcg Intravenous Bolus from Bag 10/4/24 1500)     Medical Decision Making  See ED course for remainder of care    Amount and/or Complexity of Data Reviewed  Labs: ordered.  Radiology: ordered.    Risk  Prescription drug management.  Decision regarding hospitalization.               ED Course as of 10/04/24 1629   Fri Oct 04, 2024   1356 84-year-old female, I-gel in place, heart rate 70s to 80s appears sinus on the monitor.  Wide complex rhythm, appears new from prior EKGs.  Initial i-STAT blood gas showed lactic acidemia 7.148, and hypokalemia.  The patient was started on norepi drip and intubated for definitive airway.  Bedside echo showed dilated atria, no effusion, grossly normal function, plethoric IVC. Differential includes but is not limited to acute MI versus PE versus dissection versus hypokalemia.  [BP]   1400 Had a discussion with the patient's daughter and granddaughter while the patient was taken to CT scan.  They said that the patient did not wish to be resuscitated.  I discussed guidelines for neuro prognostication but stressed that they are welcome to withdraw care at any time.  They agreed with obtaining imaging and completing the diagnostic workup to support decision of whether to withdraw care or not.  I called the ICU for admission and also discussed the case with Cardiology, as myocardial infarction seems the most likely  etiology of her VFib arrest.  Patient was ultimately admitted to ICU with imaging results pending. [BP]      ED Course User Index  [BP] Patrick Rosales MD                           Clinical Impression:  Final diagnoses:  [I46.9] Cardiac arrest  [E87.6] Hypokalemia  [E87.20] Lactic acidosis  [J96.90] Respiratory failure, unspecified chronicity, unspecified whether with hypoxia or hypercapnia          ED Disposition Condition    Admit Stable                Patrick Rosales MD  Resident  10/04/24 [id number]

## 2024-10-04 NOTE — SUBJECTIVE & OBJECTIVE
Past Medical History:   Diagnosis Date    Arthritis     Asthma     Hypertension     Lymphedema b    per pt    Stroke     Thyroid disease        Past Surgical History:   Procedure Laterality Date    APPENDECTOMY      BACK SURGERY       SECTION      HYSTERECTOMY      JOINT REPLACEMENT Left     knee    LYMPHADENECTOMY Left     groin per pt       Review of patient's allergies indicates:  No Known Allergies    No current facility-administered medications on file prior to encounter.     Current Outpatient Medications on File Prior to Encounter   Medication Sig    atorvastatin (LIPITOR) 20 MG tablet Take 20 mg by mouth once daily.    levothyroxine (SYNTHROID) 25 MCG tablet Take 25 mcg by mouth once daily.    metoprolol succinate (TOPROL-XL) 25 MG 24 hr tablet Take 25 mg by mouth once daily.    traZODone (DESYREL) 50 MG tablet Take 50 mg by mouth nightly as needed.     Family History    None       Tobacco Use    Smoking status: Never    Smokeless tobacco: Never   Substance and Sexual Activity    Alcohol use: Not Currently    Drug use: Never    Sexual activity: Not on file     Review of Systems   Unable to perform ROS: Intubated     Objective:     Vital Signs (Most Recent):  Temp: (!) 94.7 °F (34.8 °C) (10/04/24 1429)  Pulse: (!) 143 (10/04/24 1650)  Resp: (!) 26 (10/04/24 1650)  BP: 125/67 (10/04/24 1600)  SpO2: (!) 90 % (10/04/24 1650) Vital Signs (24h Range):  Temp:  [89.4 °F (31.9 °C)-94.7 °F (34.8 °C)] 94.7 °F (34.8 °C)  Pulse:  [] 143  Resp:  [0-38] 26  SpO2:  [82 %-100 %] 90 %  BP: ()/(47-78) 125/67     Weight: 66.5 kg (146 lb 9.7 oz)  Body mass index is 24.4 kg/m².     Physical Exam  Vitals reviewed.   Constitutional:       Interventions: She is sedated and intubated.   HENT:      Mouth/Throat:      Comments: ETT in place  Lip smacking present  Cardiovascular:      Rate and Rhythm: Normal rate.   Pulmonary:      Effort: No respiratory distress. She is intubated.   Abdominal:      General: Abdomen  is flat.      Palpations: Abdomen is soft.   Musculoskeletal:      Right lower leg: Edema present.      Left lower leg: Edema present.      Comments: Lymphedema present bilateral lower extremities   Neurological:      Comments: decerebrate posturing present with arms in extension and internally rotated and legs in extension                Significant Labs: All pertinent labs within the past 24 hours have been reviewed.    Significant Imaging: I have reviewed all pertinent imaging results/findings within the past 24 hours.

## 2024-10-04 NOTE — HPI
HPI retrieved from chart and family. Patient intubated and sedated. 84-year-old female with dementia, hypertension, and type 2 diabetes presents with a chief complaint of cardiac arrest. EMS says that they were called to the house because the patient was having chest pain. They said that she had a witnessed VFib arrest, received 7 shocks and 4 epinephrine in the field with ROSC obtained in 40 minutes.  Troponin 0.1. Echo pending. Temp 90.9 in the ED, TTM protocol per primary team. Awaiting for neuroprognostication.  DNR order status noted. Currently requiring pressor support. K+ 2.6- replacement order noted.

## 2024-10-04 NOTE — NURSING
Family at bedside. Pre-medications given prior to extubation. Per Dr. Holbrook, will continue fentanyl drip for pain. PRN medications ordered. Comfort measures in place. Emotional support provided to family.

## 2024-10-04 NOTE — SUBJECTIVE & OBJECTIVE
Past Medical History:   Diagnosis Date    Arthritis     Asthma     Hypertension     Lymphedema b    per pt    Stroke     Thyroid disease        Past Surgical History:   Procedure Laterality Date    APPENDECTOMY      BACK SURGERY       SECTION      HYSTERECTOMY      JOINT REPLACEMENT Left     knee    LYMPHADENECTOMY Left     groin per pt       Review of patient's allergies indicates:  No Known Allergies    No current facility-administered medications on file prior to encounter.     Current Outpatient Medications on File Prior to Encounter   Medication Sig    atorvastatin (LIPITOR) 20 MG tablet Take 20 mg by mouth once daily.    levothyroxine (SYNTHROID) 25 MCG tablet Take 25 mcg by mouth once daily.    metoprolol succinate (TOPROL-XL) 25 MG 24 hr tablet Take 25 mg by mouth once daily.    traZODone (DESYREL) 50 MG tablet Take 50 mg by mouth nightly as needed.     Family History    None       Tobacco Use    Smoking status: Never    Smokeless tobacco: Never   Substance and Sexual Activity    Alcohol use: Not Currently    Drug use: Never    Sexual activity: Not on file     Review of Systems   Unable to perform ROS: Intubated     Objective:     Vital Signs (Most Recent):  Temp: (!) 94.7 °F (34.8 °C) (10/04/24 1429)  Pulse: (!) 115 (10/04/24 1429)  Resp: (!) 7 (10/04/24 1406)  BP: (!) 100/56 (10/04/24 1429)  SpO2: 100 % (10/04/24 1406) Vital Signs (24h Range):  Temp:  [89.4 °F (31.9 °C)-94.7 °F (34.8 °C)] 94.7 °F (34.8 °C)  Pulse:  [] 115  Resp:  [0-23] 7  SpO2:  [99 %-100 %] 100 %  BP: ()/(47-78) 100/56     Weight: 66.5 kg (146 lb 9.7 oz)  Body mass index is 24.4 kg/m².    SpO2: 100 %       No intake or output data in the 24 hours ending 10/04/24 1432    Lines/Drains/Airways       Drain  Duration                  NG/OG Tube 10/04/24 1221 orogastric 18 Fr. Left mouth <1 day              Airway  Duration                  Airway - Non-Surgical 10/04/24 1200 Endotracheal Tube <1 day              Peripheral  "Intravenous Line  Duration                  Peripheral IV - Single Lumen 10/04/24 1141 20 G Left Antecubital <1 day         Peripheral IV - Single Lumen 10/04/24 1150 20 G Right Wrist <1 day                     Physical Exam  Constitutional:       Appearance: She is ill-appearing.      Interventions: She is intubated.   Eyes:      General:         Right eye: No discharge.         Left eye: No discharge.   Cardiovascular:      Rate and Rhythm: Regular rhythm. Tachycardia present.      Heart sounds: Murmur heard.   Pulmonary:      Effort: She is intubated.   Abdominal:      General: Bowel sounds are normal.      Palpations: Abdomen is soft.   Musculoskeletal:      Right lower leg: No edema.      Left lower leg: No edema.   Skin:     General: Skin is warm and dry.          Significant Labs: ABG:   Recent Labs   Lab 10/04/24  1148 10/04/24  1302   PH 7.148* 7.158*   PCO2 34.5* 40.9   HCO3 12.0* 14.5*   POCSATURATED 85.9* 99.6   , Blood Culture: No results for input(s): "LABBLOO" in the last 48 hours., BMP:   Recent Labs   Lab 10/04/24  1149   *      K 2.6*      CO2 11*   BUN 10   CREATININE 0.9   CALCIUM 7.6*   MG 1.7   , CMP   Recent Labs   Lab 10/04/24  1149      K 2.6*      CO2 11*   *   BUN 10   CREATININE 0.9   CALCIUM 7.6*   PROT 4.9*   ALBUMIN 2.3*   BILITOT 0.5   ALKPHOS 66   AST 94*   ALT 61*   ANIONGAP 19*   , CBC   Recent Labs   Lab 10/04/24  1149   WBC 6.77   HGB 10.3*   HCT 33.0*      , INR   Recent Labs   Lab 10/04/24  1149   INR 1.2   , Lipid Panel No results for input(s): "CHOL", "HDL", "LDLCALC", "TRIG", "CHOLHDL" in the last 48 hours., Troponin   Recent Labs   Lab 10/04/24  1149   TROPONINI 0.157*   , and All pertinent lab results from the last 24 hours have been reviewed.    Significant Imaging: Echocardiogram: Transthoracic echo (TTE) complete (Cupid Only): No results found for this or any previous visit.  " Daily Assessment

## 2024-10-04 NOTE — H&P
[facility] Medicine  History & Physical    Patient Name: Nelida Cervantes  MRN: [id number]  Patient Class: IP- Inpatient  Admission Date: 10/4/2024  Attending Physician: Kika Pugh MD   Primary Care Provider: Myriam Primary Doctor         Patient information was obtained from relative(s), past medical records, and ER records.     Subjective:     Principal Problem:Comfort measures only status    Chief Complaint:   Chief Complaint   Patient presents with    Cardiac Arrest     Pt presents to the ED for a witnessed arrest.  The pt was initially complaining of chest pain when the pt went unresponsive.  Per EMS the pt was in v-fib.  The pt was shocked x7 timed, received 4 epis, 300 mg of amiodarone, and 1 of atropine.  Pt has a pulse and is spontaneously breathing over the I-gel.         HPI: Nelida is an 85 yo female with PMH HTN, HLD, T2DM, and recent memory issues concerning for dementia who was brought in by EMS after cardiac arrest at home. Per EMS report, patient was complaining of chest pain for several hours. Per family, she was in town visiting her daughter Amanda but patient lives in [place]. She was able to call her niece who stated she was having difficulty speaking and moving. She reports she knew she was having a medical emergency. Niece called 911. Daughter was not home at the time and patient was home alone. She states patient did have difficulty trouble sleeping last night and was c/o left shoulder pain but was otherwise behaving at her baseline. When EMS arrived, she was found to be in VFib arrest requiring 40 minutes of resuscitation including 7 shocks and epinephrine x4. A temporary airway was placed and patient transported to ER.    In ER, she was intubated and sedated on propofol and fentanyl. Initial vitals with temp 98.4, HR 79, BP 69/48 and placed on levophed. Labs include CBC with H/H 10.3/33 and WBC within normal limits 6.77. CMP with K 2.6, CO2 11, and glucose 294 and  BUN/Cr 10/0.9. CXR w/ increased prominence of the central pulmonary vasculature with bilateral diffuse nonspecific interstitial coarsening also increased from prior suggesting pulmonary edema/CHF pattern. Slight blunting of both costophrenic angle suggesting trace pleural effusions. CT with No acute large vascular territory infarct or intracranial hemorrhage identified. Cerebral volume loss with sequela of chronic microvascular ischemic change and multifocal remote appearing lacunar type infarcts, as above.. EKG w/ Sinus tachycardia with Premature supraventricular complexes and Bifascicular block . Trop 0.0157. CTA chest abdomen and pelvis with No evidence of acute aortic pathology. [facility] consulted for evaluation for admission s/p cardiac arrest. After discussion with family, patient was made DNR as that is what her wishes were. She was transitioned to comfort care and palliatively extubated per their wishes.     Past Medical History:   Diagnosis Date    Arthritis     Asthma     Hypertension     Lymphedema b    per pt    Stroke     Thyroid disease        Past Surgical History:   Procedure Laterality Date    APPENDECTOMY      BACK SURGERY       SECTION      HYSTERECTOMY      JOINT REPLACEMENT Left     knee    LYMPHADENECTOMY Left     groin per pt       Review of patient's allergies indicates:  No Known Allergies    No current facility-administered medications on file prior to encounter.     Current Outpatient Medications on File Prior to Encounter   Medication Sig    atorvastatin (LIPITOR) 20 MG tablet Take 20 mg by mouth once daily.    levothyroxine (SYNTHROID) 25 MCG tablet Take 25 mcg by mouth once daily.    metoprolol succinate (TOPROL-XL) 25 MG 24 hr tablet Take 25 mg by mouth once daily.    traZODone (DESYREL) 50 MG tablet Take 50 mg by mouth nightly as needed.     Family History    None       Tobacco Use    Smoking status: Never    Smokeless tobacco: Never   Substance and Sexual Activity     Alcohol use: Not Currently    Drug use: Never    Sexual activity: Not on file     Review of Systems   Unable to perform ROS: Intubated     Objective:     Vital Signs (Most Recent):  Temp: (!) 94.7 °F (34.8 °C) (10/04/24 1429)  Pulse: (!) 143 (10/04/24 1650)  Resp: (!) 26 (10/04/24 1650)  BP: 125/67 (10/04/24 1600)  SpO2: (!) 90 % (10/04/24 1650) Vital Signs (24h Range):  Temp:  [89.4 °F (31.9 °C)-94.7 °F (34.8 °C)] 94.7 °F (34.8 °C)  Pulse:  [] 143  Resp:  [0-38] 26  SpO2:  [82 %-100 %] 90 %  BP: ()/(47-78) 125/67     Weight: 66.5 kg (146 lb 9.7 oz)  Body mass index is 24.4 kg/m².     Physical Exam  Vitals reviewed.   Constitutional:       Interventions: She is sedated and intubated.   HENT:      Mouth/Throat:      Comments: ETT in place  Lip smacking present  Cardiovascular:      Rate and Rhythm: Normal rate.   Pulmonary:      Effort: No respiratory distress. She is intubated.   Abdominal:      General: Abdomen is flat.      Palpations: Abdomen is soft.   Musculoskeletal:      Right lower leg: Edema present.      Left lower leg: Edema present.      Comments: Lymphedema present bilateral lower extremities   Neurological:      Comments: decerebrate posturing present with arms in extension and internally rotated and legs in extension                Significant Labs: All pertinent labs within the past 24 hours have been reviewed.    Significant Imaging: I have reviewed all pertinent imaging results/findings within the past 24 hours.  Assessment/Plan:     * Comfort measures only status  Per family wishes    Plan:  Comfort care orders as below  IV morphine and IV Ativan PRN for tachypnea or discomfort      NSTEMI (non-ST elevated myocardial infarction)  Elevated trop on admission  Cardiology was consulted in the ER.      Cardiac arrest  S/p cardiac arrest at home requiring 7 shocks and several rounds of epinephrine  Unknown etiology at this time for arrest  Imaging in ER unrevealing    Plan:  Discussed  with family and will transition to comfort care measures only to align with patient's wishes      VTE Risk Mitigation (From admission, onward)      None          Critical care time spent on the evaluation and treatment of severe organ dysfunction, review of pertinent labs and imaging studies, discussions with consulting providers and discussions with patient/family: 60 minutes.          Michelle Holbrook MD  [facility], PGY-3  10/04/2024

## 2024-10-04 NOTE — NURSING
Elevated HR and dyspnea improved with PRN medications and increased dose of morphine drip. Discussed with primary team. Family remains at bedside.

## 2024-10-04 NOTE — PLAN OF CARE
Patient received from ED transferred to ICU on ventilator with documented settings. Alarms are set and functioning with adequate volumes. AMBU bag and mask at bedside. Will continue to monitor.

## 2024-10-04 NOTE — CONSULTS
[facility] - Intensive Care  Cardiology  Consult Note    Patient Name: Nelida Cervantes  MRN: [id number]  Admission Date: 10/4/2024  Hospital Length of Stay: 0 days  Code Status: DNR   Attending Provider: Kika Pugh MD   Consulting Provider: Alon Boykin NP  Primary Care Physician: Myriam, Primary Doctor  Principal Problem:Cardiac arrest    Patient information was obtained from relative(s), past medical records, and ER records.     Inpatient consult to Cardiology  Consult performed by: Alon Boykin NP  Consult ordered by: Nick Rangel MD        Subjective:     Chief Complaint:  Cardiac arrest     HPI:   HPI retrieved from chart and family. Patient intubated and sedated. 84-year-old female with dementia, hypertension, and type 2 diabetes presents with a chief complaint of cardiac arrest. EMS says that they were called to the house because the patient was having chest pain. They said that she had a witnessed VFib arrest, received 7 shocks and 4 epinephrine in the field with ROSC obtained in 40 minutes.  Troponin 0.1. Echo pending. Temp 90.9 in the ED, TTM protocol per primary team. Awaiting for neuroprognostication.  DNR order status noted. Currently requiring pressor support. K+ 2.6- replacement order noted.     Past Medical History:   Diagnosis Date    Arthritis     Asthma     Hypertension     Lymphedema b    per pt    Stroke     Thyroid disease        Past Surgical History:   Procedure Laterality Date    APPENDECTOMY      BACK SURGERY       SECTION      HYSTERECTOMY      JOINT REPLACEMENT Left     knee    LYMPHADENECTOMY Left     groin per pt       Review of patient's allergies indicates:  No Known Allergies    No current facility-administered medications on file prior to encounter.     Current Outpatient Medications on File Prior to Encounter   Medication Sig    atorvastatin (LIPITOR) 20 MG tablet Take 20 mg by mouth once daily.    levothyroxine (SYNTHROID) 25 MCG tablet Take 25 mcg by  mouth once daily.    metoprolol succinate (TOPROL-XL) 25 MG 24 hr tablet Take 25 mg by mouth once daily.    traZODone (DESYREL) 50 MG tablet Take 50 mg by mouth nightly as needed.     Family History    None       Tobacco Use    Smoking status: Never    Smokeless tobacco: Never   Substance and Sexual Activity    Alcohol use: Not Currently    Drug use: Never    Sexual activity: Not on file     Review of Systems   Unable to perform ROS: Intubated     Objective:     Vital Signs (Most Recent):  Temp: (!) 94.7 °F (34.8 °C) (10/04/24 1429)  Pulse: (!) 115 (10/04/24 1429)  Resp: (!) 7 (10/04/24 1406)  BP: (!) 100/56 (10/04/24 1429)  SpO2: 100 % (10/04/24 1406) Vital Signs (24h Range):  Temp:  [89.4 °F (31.9 °C)-94.7 °F (34.8 °C)] 94.7 °F (34.8 °C)  Pulse:  [] 115  Resp:  [0-23] 7  SpO2:  [99 %-100 %] 100 %  BP: ()/(47-78) 100/56     Weight: 66.5 kg (146 lb 9.7 oz)  Body mass index is 24.4 kg/m².    SpO2: 100 %       No intake or output data in the 24 hours ending 10/04/24 1432    Lines/Drains/Airways       Drain  Duration                  NG/OG Tube 10/04/24 1221 orogastric 18 Fr. Left mouth <1 day              Airway  Duration                  Airway - Non-Surgical 10/04/24 1200 Endotracheal Tube <1 day              Peripheral Intravenous Line  Duration                  Peripheral IV - Single Lumen 10/04/24 1141 20 G Left Antecubital <1 day         Peripheral IV - Single Lumen 10/04/24 1150 20 G Right Wrist <1 day                     Physical Exam  Constitutional:       Appearance: She is ill-appearing.      Interventions: She is intubated.   Eyes:      General:         Right eye: No discharge.         Left eye: No discharge.   Cardiovascular:      Rate and Rhythm: Regular rhythm. Tachycardia present.      Heart sounds: Murmur heard.   Pulmonary:      Effort: She is intubated.   Abdominal:      General: Bowel sounds are normal.      Palpations: Abdomen is soft.   Musculoskeletal:      Right lower leg: No edema.  "     Left lower leg: No edema.   Skin:     General: Skin is warm and dry.          Significant Labs: ABG:   Recent Labs   Lab 10/04/24  1148 10/04/24  1302   PH 7.148* 7.158*   PCO2 34.5* 40.9   HCO3 12.0* 14.5*   POCSATURATED 85.9* 99.6   , Blood Culture: No results for input(s): "LABBLOO" in the last 48 hours., BMP:   Recent Labs   Lab 10/04/24  1149   *      K 2.6*      CO2 11*   BUN 10   CREATININE 0.9   CALCIUM 7.6*   MG 1.7   , CMP   Recent Labs   Lab 10/04/24  1149      K 2.6*      CO2 11*   *   BUN 10   CREATININE 0.9   CALCIUM 7.6*   PROT 4.9*   ALBUMIN 2.3*   BILITOT 0.5   ALKPHOS 66   AST 94*   ALT 61*   ANIONGAP 19*   , CBC   Recent Labs   Lab 10/04/24  1149   WBC 6.77   HGB 10.3*   HCT 33.0*      , INR   Recent Labs   Lab 10/04/24  1149   INR 1.2   , Lipid Panel No results for input(s): "CHOL", "HDL", "LDLCALC", "TRIG", "CHOLHDL" in the last 48 hours., Troponin   Recent Labs   Lab 10/04/24  1149   TROPONINI 0.157*   , and All pertinent lab results from the last 24 hours have been reviewed.    Significant Imaging: Echocardiogram: Transthoracic echo (TTE) complete (Cupid Only): No results found for this or any previous visit.  Assessment and Plan:     * Cardiac arrest  Patient c/p CP this AM- was able to call 911 and was a witnessed cardiac arrest by EMS  40 min for ROSC  VFib arrest, received 7 shocks and 4 epinephrine in the field  Troponin 0.1. Echo pending.   Temp 90.9 in the ED auto cooled  TTM protocol per primary team.   Awaiting for neuroprognostication.    DNR order status noted.   Currently requiring pressor support. K+ 2.6- replacement order noted.   No recurrent VT/VF on tele review   Trend troponin  DAPT, statin, heparin gtt if no contraindication   No BB given pressor use      NSTEMI (non-ST elevated myocardial infarction)  Type I vs type II in setting of cardiac arrest  Troponin 0.01- continue to trend until peaks  DAPT, statin, heparin gtt (48 " H)  if no contraindication  Further care as outlined per cardiac arrest     Hypotension  SBP 60s on admission  Currently on Levophed at 0.26 to maintain BP  Holding antihypertensives         VTE Risk Mitigation (From admission, onward)      None            Thank you for your consult. I will follow-up with patient. Please contact us if you have any additional questions.    Alon Boykin, BEULAH  Cardiology   [facility] - Intensive Care

## 2024-10-04 NOTE — ACP (ADVANCE CARE PLANNING)
Advance Care Planning     Date: 10/04/2024    Today a voluntary meeting took place: bedside    Patient Participation: Patient is unable to participate     Attendees (Name and  Relationship to patient): Health care power of : Amanda and Granddaughter Jojo    Staff attendees (Name and  Role): Dr. Holbrook PGY-3 Family Medicine with primary team    ACP Conversation (General): Understanding of advance care planning and role of health care agent defined       Code Status: DNR; status confirmed/order placed in chart    ACP Documents: None    Goals of care: The family endorses that what is most important right now is to focus on comfort and QOL     Accordingly, we have decided that the best plan to meet the patient's goals includes making patient comfortable with a transition to comfort care.     Code Status  In light of the patients advanced and life limiting illness,I engaged the the family in a voluntary conversation about the patient's preferences for care  at the very end of life. The patient wishes to have a natural, peaceful death.  Along those lines, the patient does not wish to have CPR or other invasive treatments performed when her heart and/or breathing stops. I communicated to the family that a DNR order would be placed in her medical record to reflect this preference.    [facility]  I engaged the family in a voluntary conversation about advance care planning and we specifically addressed what the goals of care would be moving forward, in light of the patient's change in clinical status, specifically s/p cardiac arrest x7 in the field.  We did specifically address the patient's likely prognosis, which is poor. She has decerebrate posturing as well as myoclonus. We explored the patient's values and preferences for future care.  The family endorses that what is most important right now is to focus on comfort and QOL     Accordingly, we have decided that the best plan to meet the patient's goals includes pivot  to comfort-focused care    Length of ACP   conversation in minutes: A total of 30 min was spent on advance care planning, goals of care discussion, emotional support, formulating and communicating prognosis and exploring burden/benefit of various approaches of treatment. This discussion occurred on a fully voluntary basis with the verbal consent of the patient and/or family.    Michelle Holbrook MD  [facility], PGY-3  10/04/2024

## 2024-10-04 NOTE — NURSING
PRN medications given for comfort. HR elevated to 140s, RR 35-40. Appears more labored. Discussed with Dr. Marcus. See new orders.

## 2024-10-04 NOTE — ASSESSMENT & PLAN NOTE
Type I vs type II in setting of cardiac arrest  Troponin 0.01- continue to trend until peaks  DAPT, statin, heparin gtt (48 H)  if no contraindication  Further care as outlined per cardiac arrest

## 2024-10-04 NOTE — ASSESSMENT & PLAN NOTE
Patient c/p CP this AM- was able to call 911 and was a witnessed cardiac arrest by EMS  40 min for ROSC  VFib arrest, received 7 shocks and 4 epinephrine in the field  Troponin 0.1. Echo pending.   Temp 90.9 in the ED auto cooled  TTM protocol per primary team.   Awaiting for neuroprognostication.    DNR order status noted.   Currently requiring pressor support. K+ 2.6- replacement order noted.   No recurrent VT/VF on tele review   Trend troponin  DAPT, statin, heparin gtt if no contraindication   No BB given pressor use

## 2024-10-05 PROBLEM — E87.20 LACTIC ACIDOSIS: Status: ACTIVE | Noted: 2024-01-01

## 2024-10-05 PROBLEM — E87.6 HYPOKALEMIA: Status: ACTIVE | Noted: 2024-01-01

## 2024-10-05 PROBLEM — J96.90 RESPIRATORY FAILURE: Status: ACTIVE | Noted: 2024-01-01

## 2024-10-05 NOTE — PLAN OF CARE
Problem: Fall Injury Risk  Goal: Absence of Fall and Fall-Related Injury  Outcome: Progressing     Problem: Infection  Goal: Absence of Infection Signs and Symptoms  Outcome: Progressing     Problem: Adult Inpatient Plan of Care  Goal: Plan of Care Review  Outcome: Progressing     Problem: Palliative Care  Goal: Enhanced Quality of Life  Outcome: Progressing     Problem: Skin Injury Risk Increased  Goal: Skin Health and Integrity  Outcome: Progressing     Patient sleeping, appears comfortable. Nonresponsive to stimuli. Daughter was at bedside, but left to get some rest. Willis in place, not draining any urine.

## 2024-10-05 NOTE — ASSESSMENT & PLAN NOTE
Per family wishes    Plan:  Patient extubated;  Stepped down from ICU  Comfort care orders as below  IV morphine and IV Ativan PRN for tachypnea or discomfort

## 2024-10-05 NOTE — NURSING
Comfort measures continues. Pt currently on Morphine gtt. Dyspnea noted. PRN morphine and lorazepam given. Family at bedside.

## 2024-10-05 NOTE — NURSING
Pt remains on comfort care with morphine infusion at 10mg/hr. Appears comfortable, pt to transfer to room 529. Family remain at bedside.

## 2024-10-05 NOTE — NURSING TRANSFER
Nursing Transfer Note      10/5/2024   1:39 PM    Nurse giving handoff: DAYANARA Trejo   Nurse receiving handoff:DAYANARA Santillan     Reason patient is being transferred: step down comfort care     Transfer To: 529    Transfer via bed    Transported by: nurse, transport staff    Transfer Vital Signs:  Blood Pressure: 49/29  Heart Rate:116  O2:91%  Temperature: 97.3  Respirations:       Order for Tele Monitor? Yes      Patient belongings transferred with patient: Yes    Chart send with patient: Yes    Notified: daughter Amanda at bedside       )  1  Upon arrival to floor: cardiac monitor applied, call bell in reach, and bed in lowest position

## 2024-10-05 NOTE — PLAN OF CARE
Problem: Palliative Care  Goal: Enhanced Quality of Life  Outcome: Progressing   VN note:   Patient's chart, labs and vital signs reviewed, will be available to intervene if needed.

## 2024-10-05 NOTE — PROGRESS NOTES
[facility] Medicine  Progress Note    Patient Name: Nelida Cervantes  MRN: [id number]  Patient Class: IP- Inpatient   Admission Date: 10/4/2024  Length of Stay: 1 days  Attending Physician: Kika Pugh MD  Primary Care Provider: Myriam, Primary Doctor        Subjective:     Principal Problem:Comfort measures only status        HPI:  Nelida is an 83 yo female with PMH HTN, HLD, T2DM, and recent memory issues concerning for dementia who was brought in by EMS after cardiac arrest at home. Per EMS report, patient was complaining of chest pain for several hours. Per family, she was in town visiting her daughter Amanda but patient lives in [place]. She was able to call her niece who stated she was having difficulty speaking and moving. She reports she knew she was having a medical emergency. Niece called 911. Daughter was not home at the time and patient was home alone. She states patient did have difficulty trouble sleeping last night and was c/o left shoulder pain but was otherwise behaving at her baseline. When EMS arrived, she was found to be in VFib arrest requiring 40 minutes of resuscitation including 7 shocks and epinephrine x4. A temporary airway was placed and patient transported to ER.    In ER, she was intubated and sedated on propofol and fentanyl. Initial vitals with temp 98.4, HR 79, BP 69/48 and placed on levophed. Labs include CBC with H/H 10.3/33 and WBC within normal limits 6.77. CMP with K 2.6, CO2 11, and glucose 294 and BUN/Cr 10/0.9. CXR w/ increased prominence of the central pulmonary vasculature with bilateral diffuse nonspecific interstitial coarsening also increased from prior suggesting pulmonary edema/CHF pattern. Slight blunting of both costophrenic angle suggesting trace pleural effusions. CT with No acute large vascular territory infarct or intracranial hemorrhage identified. Cerebral volume loss with sequela of chronic microvascular ischemic change and multifocal remote  appearing lacunar type infarcts, as above.. EKG w/ Sinus tachycardia with Premature supraventricular complexes and Bifascicular block . Trop 0.0157. CTA chest abdomen and pelvis with No evidence of acute aortic pathology. [facility] consulted for evaluation for admission s/p cardiac arrest. After discussion with family, patient was made DNR as that is what her wishes were. She was transitioned to comfort care and palliatively extubated per their wishes.     Overview/Hospital Course:  No notes on file    Interval History: NAEON patient remains on comfort care; family at bedside    Review of Systems   Unable to perform ROS: Patient unresponsive     Objective:     Vital Signs (Most Recent):  Temp: 97.3 °F (36.3 °C) (10/05/24 1556)  Pulse: 94 (10/05/24 1556)  Resp: 16 (10/05/24 1556)  BP: (!) 67/42 (10/05/24 1556)  SpO2: (!) 91 % (10/05/24 1400) Vital Signs (24h Range):  Temp:  [94 °F (34.4 °C)-97.7 °F (36.5 °C)] 97.3 °F (36.3 °C)  Pulse:  [] 94  Resp:  [16-42] 16  SpO2:  [71 %-91 %] 91 %  BP: ()/(29-72) 67/42     Weight: 70 kg (154 lb 5.2 oz)  Body mass index is 25.68 kg/m².    Intake/Output Summary (Last 24 hours) at 10/5/2024 1710  Last data filed at 10/5/2024 0715  Gross per 24 hour   Intake 101.67 ml   Output 0 ml   Net 101.67 ml         Physical Exam  Vitals reviewed.   Constitutional:       Appearance: She is normal weight.      Comments: Patient Sedated on Comfort care   HENT:      Head: Normocephalic and atraumatic.      Nose: Nose normal.      Mouth/Throat:      Comments: Mouth open  Cardiovascular:      Rate and Rhythm: Regular rhythm. Tachycardia present.   Abdominal:      General: Abdomen is flat.   Musculoskeletal:      Right lower leg: No edema.      Left lower leg: No edema.           Significant Labs: All pertinent labs within the past 24 hours have been reviewed.  CBC:   Recent Labs   Lab 10/04/24  1148 10/04/24  1149   WBC  --  6.77   HGB  --  10.3*   HCT 30.8* 33.0*   PLT  --   214     CMP:   Recent Labs   Lab 10/04/24  1149      K 2.6*      CO2 11*   *   BUN 10   CREATININE 0.9   CALCIUM 7.6*   PROT 4.9*   ALBUMIN 2.3*   BILITOT 0.5   ALKPHOS 66   AST 94*   ALT 61*   ANIONGAP 19*     Lactic Acid:   Recent Labs   Lab 10/04/24  1209   LACTATE 9.8*       Significant Imaging: I have reviewed all pertinent imaging results/findings within the past 24 hours.    Assessment/Plan:      * Comfort measures only status  Per family wishes    Plan:  Patient extubated;  Stepped down from ICU  Comfort care orders as below  IV morphine and IV Ativan PRN for tachypnea or discomfort      NSTEMI (non-ST elevated myocardial infarction)  Elevated trop on admission  Cardiology was consulted in the ER.      Cardiac arrest  S/p cardiac arrest at home requiring 7 shocks and several rounds of epinephrine  Unknown etiology at this time for arrest  Imaging in ER unrevealing    Plan:  Discussed with family and will transition to comfort care measures only to align with patient's wishes      VTE Risk Mitigation (From admission, onward)      None            Discharge Planning   SHARI:      Code Status: DNR   Is the patient medically ready for discharge?:     Reason for patient still in hospital (select all that apply): Other (specify) Patient on comfort care per family's wishes.                     Jose Ruiz MD  Department of Hospital Medicine   [facility]

## 2024-10-05 NOTE — SUBJECTIVE & OBJECTIVE
Interval History: TOYA patient remains on comfort care; family at bedside    Review of Systems   Unable to perform ROS: Patient unresponsive     Objective:     Vital Signs (Most Recent):  Temp: 97.3 °F (36.3 °C) (10/05/24 1556)  Pulse: 94 (10/05/24 1556)  Resp: 16 (10/05/24 1556)  BP: (!) 67/42 (10/05/24 1556)  SpO2: (!) 91 % (10/05/24 1400) Vital Signs (24h Range):  Temp:  [94 °F (34.4 °C)-97.7 °F (36.5 °C)] 97.3 °F (36.3 °C)  Pulse:  [] 94  Resp:  [16-42] 16  SpO2:  [71 %-91 %] 91 %  BP: ()/(29-72) 67/42     Weight: 70 kg (154 lb 5.2 oz)  Body mass index is 25.68 kg/m².    Intake/Output Summary (Last 24 hours) at 10/5/2024 1710  Last data filed at 10/5/2024 0715  Gross per 24 hour   Intake 101.67 ml   Output 0 ml   Net 101.67 ml         Physical Exam  Vitals reviewed.   Constitutional:       Appearance: She is normal weight.      Comments: Patient Sedated on Comfort care   HENT:      Head: Normocephalic and atraumatic.      Nose: Nose normal.      Mouth/Throat:      Comments: Mouth open  Cardiovascular:      Rate and Rhythm: Regular rhythm. Tachycardia present.   Abdominal:      General: Abdomen is flat.   Musculoskeletal:      Right lower leg: No edema.      Left lower leg: No edema.           Significant Labs: All pertinent labs within the past 24 hours have been reviewed.  CBC:   Recent Labs   Lab 10/04/24  1148 10/04/24  1149   WBC  --  6.77   HGB  --  10.3*   HCT 30.8* 33.0*   PLT  --  214     CMP:   Recent Labs   Lab 10/04/24  1149      K 2.6*      CO2 11*   *   BUN 10   CREATININE 0.9   CALCIUM 7.6*   PROT 4.9*   ALBUMIN 2.3*   BILITOT 0.5   ALKPHOS 66   AST 94*   ALT 61*   ANIONGAP 19*     Lactic Acid:   Recent Labs   Lab 10/04/24  1209   LACTATE 9.8*       Significant Imaging: I have reviewed all pertinent imaging results/findings within the past 24 hours.

## 2024-10-06 NOTE — HOSPITAL COURSE
Nelida Cervantes was a 85y/o female with PMH HTN, HLD, T2DM, and recent memory issues concerning for dementia who was brought in by EMS after cardiac arrest at home. The patient underwent 40minutes of resuscitation and was intubated on arrival to the ED. A discussion was had with the patient's family, and it was concluded that the patient would most likely wish to be DNR, and she was transitioned to comfort care. She was palliatively extubated. The patient's family were continually updated on her status, and the patient's comfort was prioritized. On 10/06, the patient was noted to be asystole on cardiac monitoring and was found to not be breathing. An exam was performed and documented in the chart and the patient was pronounced dead. The patient  on 10/06/24 at 11:22am CST from Cardiac Arrest.

## 2024-10-06 NOTE — PLAN OF CARE
Morphine drip in progress. Patient's respirations are 28, HR 90's to 126 on telemetry. Willis care provided. Safety maintained.     Problem: Fall Injury Risk  Goal: Absence of Fall and Fall-Related Injury  Outcome: Progressing     Problem: Infection  Goal: Absence of Infection Signs and Symptoms  Outcome: Progressing     Problem: Adult Inpatient Plan of Care  Goal: Plan of Care Review  Outcome: Progressing     Problem: Adult Inpatient Plan of Care  Goal: Optimal Comfort and Wellbeing  Outcome: Progressing     Problem: Palliative Care  Goal: Enhanced Quality of Life  Outcome: Progressing     Problem: Skin Injury Risk Increased  Goal: Skin Health and Integrity  Outcome: Progressing

## 2024-10-06 NOTE — DISCHARGE SUMMARY
[facility] Medicine  Discharge Summary      Patient Name: Nelida Cervantes  MRN: [id number]  AXEL: [id number]  Patient Class: IP- Inpatient  Admission Date: 10/4/2024  Hospital Length of Stay: 2 days  Discharge Date and Time:  10/06/2024 3:32 PM  Attending Physician: Myriam att. providers found   Discharging Provider: Jerson Brower MD  Primary Care Provider: Myriam, Primary Doctor    Primary Care Team: Networked reference to record PCT     HPI:   Nelida is an 83 yo female with PMH HTN, HLD, T2DM, and recent memory issues concerning for dementia who was brought in by EMS after cardiac arrest at home. Per EMS report, patient was complaining of chest pain for several hours. Per family, she was in town visiting her daughter Amanda but patient lives in [place]. She was able to call her niece who stated she was having difficulty speaking and moving. She reports she knew she was having a medical emergency. Niece called 911. Daughter was not home at the time and patient was home alone. She states patient did have difficulty trouble sleeping last night and was c/o left shoulder pain but was otherwise behaving at her baseline. When EMS arrived, she was found to be in VFib arrest requiring 40 minutes of resuscitation including 7 shocks and epinephrine x4. A temporary airway was placed and patient transported to ER.    In ER, she was intubated and sedated on propofol and fentanyl. Initial vitals with temp 98.4, HR 79, BP 69/48 and placed on levophed. Labs include CBC with H/H 10.3/33 and WBC within normal limits 6.77. CMP with K 2.6, CO2 11, and glucose 294 and BUN/Cr 10/0.9. CXR w/ increased prominence of the central pulmonary vasculature with bilateral diffuse nonspecific interstitial coarsening also increased from prior suggesting pulmonary edema/CHF pattern. Slight blunting of both costophrenic angle suggesting trace pleural effusions. CT with No acute large vascular territory infarct or intracranial hemorrhage  identified. Cerebral volume loss with sequela of chronic microvascular ischemic change and multifocal remote appearing lacunar type infarcts, as above.. EKG w/ Sinus tachycardia with Premature supraventricular complexes and Bifascicular block . Trop 0.0157. CTA chest abdomen and pelvis with No evidence of acute aortic pathology. [facility] consulted for evaluation for admission s/p cardiac arrest. After discussion with family, patient was made DNR as that is what her wishes were. She was transitioned to comfort care and palliatively extubated per their wishes.     * No surgery found *      Hospital Course:   Nelida Cervantes was a 83y/o female with PMH HTN, HLD, T2DM, and recent memory issues concerning for dementia who was brought in by EMS after cardiac arrest at home. The patient underwent 40minutes of resuscitation and was intubated on arrival to the ED. A discussion was had with the patient's family, and it was concluded that the patient would most likely wish to be DNR, and she was transitioned to comfort care. She was palliatively extubated. The patient's family were continually updated on her status, and the patient's comfort was prioritized. On 10/06, the patient was noted to be asystole on cardiac monitoring and was found to not be breathing. An exam was performed and documented in the chart and the patient was pronounced dead. The patient  on 10/06/24 at 11:22am CST from Cardiac Arrest.     Physical Exam:  Patient is not responding to verbal or tactile stimuli. Patient does not have a pupillary or corneal reflex.   Her pupils are fixed and dilated.   No heart or breath sounds on auscultation.   No respirations.   No palpable pulses.     Goals of Care Treatment Preferences:  Code Status: DNR    Health care agent: Amanda  Health care agent number: No value filed.          What is most important right now is to focus on comfort and QOL .  Accordingly, we have decided that the best plan to meet the  "patient's goals includes pivot to comfort-focused care.      SDOH Screening:  The patient was unable to be screened for utility difficulties, food insecurity, transport difficulties, housing insecurity, and interpersonal safety, so no concerns could be identified this admission.     Consults:   Consults (From admission, onward)          Status Ordering Provider     Inpatient consult to Cardiology  Once        Provider:  (Not yet assigned)    Completed ELIJAH MATIAS            No new Assessment & Plan notes have been filed under this hospital service since the last note was generated.  Service: Hospital Medicine    Final Active Diagnoses:    Diagnosis Date Noted POA    PRINCIPAL PROBLEM:  Comfort measures only status [Z51.5] 10/04/2024 Not Applicable    Hypokalemia [E87.6] 10/05/2024 Yes    Lactic acidosis [E87.20] 10/05/2024 Yes    Respiratory failure [J96.90] 10/05/2024 Yes    Cardiac arrest [I46.9] 10/04/2024 Yes    NSTEMI (non-ST elevated myocardial infarction) [I21.4] 10/04/2024 Yes      Problems Resolved During this Admission:    Diagnosis Date Noted Date Resolved POA    Hypotension [I95.9] 10/04/2024 10/04/2024 Yes       Discharged Condition:     Disposition:     Follow Up:    Patient Instructions:   No discharge procedures on file.    Significant Diagnostic Studies: Labs: CMP No results for input(s): "NA", "K", "CL", "CO2", "GLU", "BUN", "CREATININE", "CALCIUM", "PROT", "ALBUMIN", "BILITOT", "ALKPHOS", "AST", "ALT", "ANIONGAP", "ESTGFRAFRICA", "EGFRNONAA" in the last 48 hours. and CBC No results for input(s): "WBC", "HGB", "HCT", "PLT" in the last 48 hours.    Pending Diagnostic Studies:       None           Medications:  Reconciled Home Medications:      Medication List        STOP taking these medications      atorvastatin 20 MG tablet  Commonly known as: LIPITOR     levothyroxine 25 MCG tablet  Commonly known as: SYNTHROID     metoprolol succinate 25 MG 24 hr tablet  Commonly known as: " TOPROL-XL     traZODone 50 MG tablet  Commonly known as: DESYREL              Indwelling Lines/Drains at time of discharge:   Lines/Drains/Airways       None                   Time spent on the discharge of patient: >30 minutes         Jerson Brower MD  Department of Hospital Medicine  [facility]

## 2024-10-06 NOTE — CARE UPDATE
Family Medicine                                                                                       Death Note      Called to bedside by patient's nurse. Nursing supervisor notified. Family not at beside.  Patient is not responding to verbal or tactile stimuli. Patient does not have a pupillary or corneal reflex.   Her pupils are fixed and dilated.   No heart or breath sounds on auscultation.   No respirations.   No palpable pulses.     Time of death 11:22 AM 10/06/2024    Cause of Death: Cardiac Arrest    ________________________  Jerson Brower Jr, MD  [facility] PGY-2